# Patient Record
Sex: FEMALE | Race: WHITE | NOT HISPANIC OR LATINO | Employment: FULL TIME | URBAN - METROPOLITAN AREA
[De-identification: names, ages, dates, MRNs, and addresses within clinical notes are randomized per-mention and may not be internally consistent; named-entity substitution may affect disease eponyms.]

---

## 2017-02-07 RX ORDER — LEVOTHYROXINE SODIUM 0.2 MG/1
TABLET ORAL
Qty: 90 TABLET | Refills: 0 | Status: SHIPPED | OUTPATIENT
Start: 2017-02-07 | End: 2017-05-28 | Stop reason: SDUPTHER

## 2017-02-07 RX ORDER — FUROSEMIDE 20 MG/1
TABLET ORAL
Qty: 90 TABLET | Refills: 0 | Status: SHIPPED | OUTPATIENT
Start: 2017-02-07 | End: 2017-05-28 | Stop reason: SDUPTHER

## 2017-02-07 RX ORDER — LISINOPRIL 20 MG/1
TABLET ORAL
Qty: 90 TABLET | Refills: 0 | Status: SHIPPED | OUTPATIENT
Start: 2017-02-07 | End: 2017-03-27

## 2017-03-27 ENCOUNTER — OFFICE VISIT (OUTPATIENT)
Dept: FAMILY MEDICINE CLINIC | Facility: CLINIC | Age: 57
End: 2017-03-27

## 2017-03-27 ENCOUNTER — CONVERSION ENCOUNTER (OUTPATIENT)
Dept: FAMILY MEDICINE CLINIC | Facility: CLINIC | Age: 57
End: 2017-03-27

## 2017-03-27 VITALS
OXYGEN SATURATION: 94 % | WEIGHT: 231 LBS | BODY MASS INDEX: 37.12 KG/M2 | HEIGHT: 66 IN | HEART RATE: 75 BPM | DIASTOLIC BLOOD PRESSURE: 82 MMHG | SYSTOLIC BLOOD PRESSURE: 132 MMHG | TEMPERATURE: 98.1 F | RESPIRATION RATE: 16 BRPM

## 2017-03-27 DIAGNOSIS — I10 BENIGN ESSENTIAL HTN: Primary | ICD-10-CM

## 2017-03-27 DIAGNOSIS — E03.9 ADULT HYPOTHYROIDISM: ICD-10-CM

## 2017-03-27 DIAGNOSIS — J30.1 SEASONAL ALLERGIC RHINITIS DUE TO POLLEN: ICD-10-CM

## 2017-03-27 DIAGNOSIS — L30.9 DERMATITIS: ICD-10-CM

## 2017-03-27 DIAGNOSIS — R53.82 CHRONIC FATIGUE: ICD-10-CM

## 2017-03-27 PROCEDURE — 99214 OFFICE O/P EST MOD 30 MIN: CPT | Performed by: FAMILY MEDICINE

## 2017-03-27 RX ORDER — ESCITALOPRAM OXALATE 10 MG/1
10 TABLET ORAL DAILY
Qty: 90 TABLET | Refills: 3 | Status: SHIPPED | OUTPATIENT
Start: 2017-03-27 | End: 2018-03-22 | Stop reason: SDUPTHER

## 2017-03-27 NOTE — PROGRESS NOTES
Subjective   Christy You is a 56 y.o. female presenting with   Chief Complaint   Patient presents with   • Rash     across bridge of nose  off and on    • Med Refill     lexapro  send to mail order         HPI Comments: 56-year-old single white female nonsmoker here because she says she is tired all the time and she has a lot of aching joints and she also has a rash on her face.  When she looked it up on the Internet she was advised she might have lupus so she would like to be checked for that.    She tells me that even though she has had a left knee replacement, she still has pain in the left knee that is bad enough she has to take something at night to help her sleep like Tylenol.  She can walk without the aid of a cane or walker.    She also says that although she is chronically fatigued she does not have any shortness of breath or chest pain or palpitations or leg swelling or weight loss or night sweats.    Rash   Associated symptoms include fatigue.        The following portions of the patient's history were reviewed and updated as appropriate: current medications, past family history, past medical history, past social history, past surgical history and problem list.    Review of Systems   Constitutional: Positive for fatigue.   Musculoskeletal: Positive for arthralgias.   Skin: Positive for rash.   All other systems reviewed and are negative.      Objective   Physical Exam   Constitutional: She is oriented to person, place, and time. She appears well-developed and well-nourished. No distress.   HENT:   Head: Normocephalic and atraumatic.   Mouth/Throat: Oropharynx is clear and moist. No oropharyngeal exudate.   Eyes: EOM are normal. Pupils are equal, round, and reactive to light. No scleral icterus.   Neck: Normal range of motion. Neck supple. No thyromegaly present.   Cardiovascular: Normal rate, regular rhythm, normal heart sounds and intact distal pulses.  Exam reveals no friction rub.    No murmur  heard.  Pulmonary/Chest: Effort normal and breath sounds normal. No respiratory distress. She has no wheezes.   Musculoskeletal: Normal range of motion. She exhibits tenderness (he has tenderness to range of motion of her knee without any gross deformity or erythema).   Lymphadenopathy:     She has no cervical adenopathy.   Neurological: She is alert and oriented to person, place, and time. No cranial nerve deficit. Coordination normal.   Skin: Skin is warm and dry. Rash (he has a rash on her face and upper neck that looks more compatible with rosacea down with a lupus rash.) noted. She is not diaphoretic.   Psychiatric: She has a normal mood and affect. Her behavior is normal.   Nursing note and vitals reviewed.      Assessment/Plan   Christy was seen today for rash and med refill.    Diagnoses and all orders for this visit:    Benign essential HTN  -     Comprehensive Metabolic Panel  -     TSH  -     T4, Free    Seasonal allergic rhinitis due to pollen    Adult hypothyroidism  -     TSH  -     CBC & Differential  -     T4, Free    Dermatitis  -     CBC & Differential  -     Rheumatoid factor  -     MARTHA  -     C-reactive protein    Chronic fatigue                   I would like him to return for another visit in 6 month(s)

## 2017-03-27 NOTE — PATIENT INSTRUCTIONS
This is a very nice 56-year-old who has been suffering with marked fatigue lately and also is concerned about the facial rash she has.  I will request blood work to evaluate the possibility of lupus and also to further evaluate her fatigue.  I will call her when these results are available.

## 2017-03-28 LAB
ALBUMIN SERPL-MCNC: 4.2 G/DL (ref 3.5–5.2)
ALBUMIN/GLOB SERPL: 1.6 G/DL
ALP SERPL-CCNC: 122 U/L (ref 39–117)
ALT SERPL-CCNC: 36 U/L (ref 1–33)
ANA SER QL: NEGATIVE
AST SERPL-CCNC: 22 U/L (ref 1–32)
BASOPHILS # BLD AUTO: 0.02 10*3/MM3 (ref 0–0.2)
BASOPHILS NFR BLD AUTO: 0.4 % (ref 0–1.5)
BILIRUB SERPL-MCNC: 0.3 MG/DL (ref 0.1–1.2)
BUN SERPL-MCNC: 15 MG/DL (ref 6–20)
BUN/CREAT SERPL: 26.8 (ref 7–25)
CALCIUM SERPL-MCNC: 9.9 MG/DL (ref 8.6–10.5)
CHLORIDE SERPL-SCNC: 103 MMOL/L (ref 98–107)
CO2 SERPL-SCNC: 28.8 MMOL/L (ref 22–29)
CREAT SERPL-MCNC: 0.56 MG/DL (ref 0.57–1)
CRP SERPL-MCNC: 0.14 MG/DL (ref 0–0.5)
EOSINOPHIL # BLD AUTO: 0.12 10*3/MM3 (ref 0–0.7)
EOSINOPHIL NFR BLD AUTO: 2.2 % (ref 0.3–6.2)
ERYTHROCYTE [DISTWIDTH] IN BLOOD BY AUTOMATED COUNT: 12.8 % (ref 11.7–13)
GLOBULIN SER CALC-MCNC: 2.6 GM/DL
GLUCOSE SERPL-MCNC: 97 MG/DL (ref 65–99)
HCT VFR BLD AUTO: 42.5 % (ref 35.6–45.5)
HGB BLD-MCNC: 14.4 G/DL (ref 11.9–15.5)
IMM GRANULOCYTES # BLD: 0 10*3/MM3 (ref 0–0.03)
IMM GRANULOCYTES NFR BLD: 0 % (ref 0–0.5)
LYMPHOCYTES # BLD AUTO: 1.79 10*3/MM3 (ref 0.9–4.8)
LYMPHOCYTES NFR BLD AUTO: 33.2 % (ref 19.6–45.3)
MCH RBC QN AUTO: 31.9 PG (ref 26.9–32)
MCHC RBC AUTO-ENTMCNC: 33.9 G/DL (ref 32.4–36.3)
MCV RBC AUTO: 94 FL (ref 80.5–98.2)
MONOCYTES # BLD AUTO: 0.42 10*3/MM3 (ref 0.2–1.2)
MONOCYTES NFR BLD AUTO: 7.8 % (ref 5–12)
NEUTROPHILS # BLD AUTO: 3.04 10*3/MM3 (ref 1.9–8.1)
NEUTROPHILS NFR BLD AUTO: 56.4 % (ref 42.7–76)
PLATELET # BLD AUTO: 194 10*3/MM3 (ref 140–500)
POTASSIUM SERPL-SCNC: 4.1 MMOL/L (ref 3.5–5.2)
PROT SERPL-MCNC: 6.8 G/DL (ref 6–8.5)
RBC # BLD AUTO: 4.52 10*6/MM3 (ref 3.9–5.2)
RHEUMATOID FACT SERPL-ACNC: <10 IU/ML (ref 0–13.9)
SODIUM SERPL-SCNC: 145 MMOL/L (ref 136–145)
T4 FREE SERPL-MCNC: 2.18 NG/DL (ref 0.93–1.7)
TSH SERPL DL<=0.005 MIU/L-ACNC: 0.31 MIU/ML (ref 0.27–4.2)
WBC # BLD AUTO: 5.39 10*3/MM3 (ref 4.5–10.7)

## 2017-03-30 DIAGNOSIS — L30.9 DERMATITIS: Primary | ICD-10-CM

## 2017-04-04 ENCOUNTER — TELEPHONE (OUTPATIENT)
Dept: FAMILY MEDICINE CLINIC | Facility: CLINIC | Age: 57
End: 2017-04-04

## 2017-04-04 NOTE — TELEPHONE ENCOUNTER
Gwendolyn, looks like rheum factor not done with rest of labs. Maybe get to add on or have come back? This is in your overdue task thanks

## 2017-05-30 RX ORDER — FUROSEMIDE 20 MG/1
TABLET ORAL
Qty: 90 TABLET | Refills: 0 | Status: SHIPPED | OUTPATIENT
Start: 2017-05-30 | End: 2017-08-28 | Stop reason: SDUPTHER

## 2017-05-30 RX ORDER — LEVOTHYROXINE SODIUM 0.2 MG/1
TABLET ORAL
Qty: 90 TABLET | Refills: 0 | Status: SHIPPED | OUTPATIENT
Start: 2017-05-30 | End: 2017-08-28 | Stop reason: SDUPTHER

## 2017-05-30 RX ORDER — LISINOPRIL 20 MG/1
TABLET ORAL
Qty: 90 TABLET | Refills: 0 | Status: SHIPPED | OUTPATIENT
Start: 2017-05-30 | End: 2017-08-28 | Stop reason: SDUPTHER

## 2017-08-28 RX ORDER — FUROSEMIDE 20 MG/1
TABLET ORAL
Qty: 90 TABLET | Refills: 0 | Status: SHIPPED | OUTPATIENT
Start: 2017-08-28 | End: 2017-11-26 | Stop reason: SDUPTHER

## 2017-08-28 RX ORDER — LISINOPRIL 20 MG/1
TABLET ORAL
Qty: 90 TABLET | Refills: 0 | Status: SHIPPED | OUTPATIENT
Start: 2017-08-28 | End: 2017-11-26 | Stop reason: SDUPTHER

## 2017-08-28 RX ORDER — LEVOTHYROXINE SODIUM 0.2 MG/1
TABLET ORAL
Qty: 90 TABLET | Refills: 0 | Status: SHIPPED | OUTPATIENT
Start: 2017-08-28 | End: 2017-11-26 | Stop reason: SDUPTHER

## 2017-09-22 ENCOUNTER — OFFICE VISIT (OUTPATIENT)
Dept: FAMILY MEDICINE CLINIC | Facility: CLINIC | Age: 57
End: 2017-09-22

## 2017-09-22 VITALS
SYSTOLIC BLOOD PRESSURE: 130 MMHG | TEMPERATURE: 98.5 F | BODY MASS INDEX: 37.77 KG/M2 | WEIGHT: 235 LBS | OXYGEN SATURATION: 96 % | HEART RATE: 85 BPM | HEIGHT: 66 IN | DIASTOLIC BLOOD PRESSURE: 76 MMHG

## 2017-09-22 DIAGNOSIS — E03.9 ADULT HYPOTHYROIDISM: ICD-10-CM

## 2017-09-22 DIAGNOSIS — I10 BENIGN ESSENTIAL HTN: ICD-10-CM

## 2017-09-22 DIAGNOSIS — J01.00 ACUTE NON-RECURRENT MAXILLARY SINUSITIS: Primary | ICD-10-CM

## 2017-09-22 PROCEDURE — 99213 OFFICE O/P EST LOW 20 MIN: CPT | Performed by: FAMILY MEDICINE

## 2017-09-22 RX ORDER — AMOXICILLIN AND CLAVULANATE POTASSIUM 875; 125 MG/1; MG/1
1 TABLET, FILM COATED ORAL 2 TIMES DAILY
Qty: 14 TABLET | Refills: 0 | Status: SHIPPED | OUTPATIENT
Start: 2017-09-22 | End: 2019-07-16

## 2017-09-22 RX ORDER — BENZONATATE 100 MG/1
CAPSULE ORAL
Refills: 0 | COMMUNITY
Start: 2017-09-18 | End: 2017-09-22

## 2017-09-22 NOTE — PATIENT INSTRUCTIONS
This is a very nice 56-year-old who is here for sinusitis and cough.  I will prescribe Augmentin to take twice a day with food but would like her to call if she does not get better.

## 2017-09-22 NOTE — PROGRESS NOTES
Subjective   Christy You is a 56 y.o. female presenting with   Chief Complaint   Patient presents with   • URI     cough  sinus and chest congestion         HPI Comments: This is a 56-year-old white female nonsmoker who comes in today with marked sinus congestion and drainage producing a cough.  She tried a telemedicine doctor who prescribed Tessalon, but when she woke up the next morning her lips were all swollen so she is allergic to that.  She was not given an antibiotic but is far she knows is not allergic to any medications other than the Tessalon.    She does not have any chest pain or shortness of breath or high fever or chills.    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        The following portions of the patient's history were reviewed and updated as appropriate: current medications, past family history, past medical history, past social history, past surgical history and problem list.    Review of Systems   HENT: Positive for congestion, postnasal drip, rhinorrhea and sinus pain.    Respiratory: Positive for cough.    All other systems reviewed and are negative.      Objective   Physical Exam   Constitutional: She is oriented to person, place, and time. She appears well-developed and well-nourished. No distress.   HENT:   Head: Normocephalic and atraumatic.   Right Ear: External ear normal.   Left Ear: External ear normal.   Nose: Mucosal edema and rhinorrhea present. Right sinus exhibits maxillary sinus tenderness. Left sinus exhibits maxillary sinus tenderness.   Mouth/Throat: Oropharynx is clear and moist. No oropharyngeal exudate.   Eyes: EOM are normal. Pupils are equal, round, and reactive to light. No scleral icterus.   Neck: Normal range of motion. Neck supple. No thyromegaly present.   Cardiovascular: Normal rate and regular rhythm.     Pulmonary/Chest: Effort normal and breath sounds normal.   Lymphadenopathy:     She has no cervical adenopathy.   Neurological: She is alert and oriented to person, place, and time.   Skin: Skin is warm and dry. She is not diaphoretic.   Psychiatric: She has a normal mood and affect. Her behavior is normal.   Nursing note and vitals reviewed.      Assessment/Plan   Christy was seen today for uri.    Diagnoses and all orders for this visit:    Acute non-recurrent maxillary sinusitis    Benign essential HTN    Adult hypothyroidism    Other orders  -     amoxicillin-clavulanate (AUGMENTIN) 875-125 MG per tablet; Take 1 tablet by mouth 2 (Two) Times a Day.                   I would like him to return for another visit in 6 month(s)

## 2017-11-27 RX ORDER — LEVOTHYROXINE SODIUM 0.2 MG/1
TABLET ORAL
Qty: 90 TABLET | Refills: 0 | Status: SHIPPED | OUTPATIENT
Start: 2017-11-27 | End: 2018-02-24 | Stop reason: SDUPTHER

## 2017-11-27 RX ORDER — FUROSEMIDE 20 MG/1
TABLET ORAL
Qty: 90 TABLET | Refills: 0 | Status: SHIPPED | OUTPATIENT
Start: 2017-11-27 | End: 2018-02-24 | Stop reason: SDUPTHER

## 2017-11-27 RX ORDER — LISINOPRIL 20 MG/1
TABLET ORAL
Qty: 90 TABLET | Refills: 0 | Status: SHIPPED | OUTPATIENT
Start: 2017-11-27 | End: 2018-02-24 | Stop reason: SDUPTHER

## 2018-02-26 RX ORDER — LISINOPRIL 20 MG/1
TABLET ORAL
Qty: 90 TABLET | Refills: 0 | Status: SHIPPED | OUTPATIENT
Start: 2018-02-26 | End: 2018-04-03 | Stop reason: SDUPTHER

## 2018-02-26 RX ORDER — LEVOTHYROXINE SODIUM 0.2 MG/1
TABLET ORAL
Qty: 90 TABLET | Refills: 0 | Status: SHIPPED | OUTPATIENT
Start: 2018-02-26 | End: 2018-04-03 | Stop reason: SDUPTHER

## 2018-02-26 RX ORDER — FUROSEMIDE 20 MG/1
TABLET ORAL
Qty: 90 TABLET | Refills: 0 | Status: SHIPPED | OUTPATIENT
Start: 2018-02-26 | End: 2018-04-03 | Stop reason: SDUPTHER

## 2018-03-22 RX ORDER — ESCITALOPRAM OXALATE 10 MG/1
TABLET ORAL
Qty: 90 TABLET | Refills: 0 | Status: SHIPPED | OUTPATIENT
Start: 2018-03-22 | End: 2018-04-03 | Stop reason: SDUPTHER

## 2018-04-04 RX ORDER — ESCITALOPRAM OXALATE 10 MG/1
10 TABLET ORAL DAILY
Qty: 90 TABLET | Refills: 0 | Status: SHIPPED | OUTPATIENT
Start: 2018-04-04 | End: 2018-06-20 | Stop reason: SDUPTHER

## 2018-04-04 RX ORDER — FUROSEMIDE 20 MG/1
20 TABLET ORAL DAILY
Qty: 90 TABLET | Refills: 0 | Status: SHIPPED | OUTPATIENT
Start: 2018-04-04 | End: 2018-06-20 | Stop reason: SDUPTHER

## 2018-04-04 RX ORDER — LEVOTHYROXINE SODIUM 0.2 MG/1
200 TABLET ORAL DAILY
Qty: 90 TABLET | Refills: 0 | Status: SHIPPED | OUTPATIENT
Start: 2018-04-04 | End: 2018-06-20 | Stop reason: SDUPTHER

## 2018-04-04 RX ORDER — LISINOPRIL 20 MG/1
20 TABLET ORAL DAILY
Qty: 90 TABLET | Refills: 0 | Status: SHIPPED | OUTPATIENT
Start: 2018-04-04 | End: 2018-06-20 | Stop reason: SDUPTHER

## 2018-06-20 RX ORDER — ESCITALOPRAM OXALATE 10 MG/1
10 TABLET ORAL DAILY
Qty: 90 TABLET | Refills: 0 | Status: SHIPPED | OUTPATIENT
Start: 2018-06-20 | End: 2018-09-17 | Stop reason: SDUPTHER

## 2018-06-20 RX ORDER — LISINOPRIL 20 MG/1
20 TABLET ORAL DAILY
Qty: 90 TABLET | Refills: 0 | Status: SHIPPED | OUTPATIENT
Start: 2018-06-20 | End: 2018-09-17 | Stop reason: SDUPTHER

## 2018-06-20 RX ORDER — LEVOTHYROXINE SODIUM 0.2 MG/1
200 TABLET ORAL DAILY
Qty: 90 TABLET | Refills: 0 | Status: SHIPPED | OUTPATIENT
Start: 2018-06-20 | End: 2018-09-17 | Stop reason: SDUPTHER

## 2018-06-20 RX ORDER — FUROSEMIDE 20 MG/1
20 TABLET ORAL DAILY
Qty: 90 TABLET | Refills: 0 | Status: SHIPPED | OUTPATIENT
Start: 2018-06-20 | End: 2018-09-17 | Stop reason: SDUPTHER

## 2018-09-17 RX ORDER — LISINOPRIL 20 MG/1
20 TABLET ORAL DAILY
Qty: 90 TABLET | Refills: 0 | Status: SHIPPED | OUTPATIENT
Start: 2018-09-17 | End: 2018-12-17 | Stop reason: SDUPTHER

## 2018-09-17 RX ORDER — LEVOTHYROXINE SODIUM 0.2 MG/1
200 TABLET ORAL DAILY
Qty: 90 TABLET | Refills: 0 | Status: SHIPPED | OUTPATIENT
Start: 2018-09-17 | End: 2018-12-17 | Stop reason: SDUPTHER

## 2018-09-17 RX ORDER — ESCITALOPRAM OXALATE 10 MG/1
10 TABLET ORAL DAILY
Qty: 90 TABLET | Refills: 0 | Status: SHIPPED | OUTPATIENT
Start: 2018-09-17 | End: 2018-12-17 | Stop reason: SDUPTHER

## 2018-09-17 RX ORDER — FUROSEMIDE 20 MG/1
20 TABLET ORAL DAILY
Qty: 90 TABLET | Refills: 0 | Status: SHIPPED | OUTPATIENT
Start: 2018-09-17 | End: 2018-12-17 | Stop reason: SDUPTHER

## 2018-09-17 NOTE — TELEPHONE ENCOUNTER
rx request for   escitalopram (LEXAPRO) 10 MG  furosemide (LASIX) 20 MG   levothyroxine (SYNTHROID, LEVOTHROID) 200 MCG   lisinopril (PRINIVIL,ZESTRIL) 20 MG     last seen 9/22/17

## 2018-12-17 RX ORDER — LISINOPRIL 20 MG/1
20 TABLET ORAL DAILY
Qty: 90 TABLET | Refills: 0 | Status: SHIPPED | OUTPATIENT
Start: 2018-12-17 | End: 2019-03-18 | Stop reason: SDUPTHER

## 2018-12-17 RX ORDER — FUROSEMIDE 20 MG/1
20 TABLET ORAL DAILY
Qty: 90 TABLET | Refills: 0 | Status: SHIPPED | OUTPATIENT
Start: 2018-12-17 | End: 2019-03-18 | Stop reason: SDUPTHER

## 2018-12-17 RX ORDER — LEVOTHYROXINE SODIUM 0.2 MG/1
200 TABLET ORAL DAILY
Qty: 90 TABLET | Refills: 0 | Status: SHIPPED | OUTPATIENT
Start: 2018-12-17 | End: 2019-03-18 | Stop reason: SDUPTHER

## 2018-12-17 RX ORDER — ESCITALOPRAM OXALATE 10 MG/1
10 TABLET ORAL DAILY
Qty: 90 TABLET | Refills: 0 | Status: SHIPPED | OUTPATIENT
Start: 2018-12-17 | End: 2019-03-18 | Stop reason: SDUPTHER

## 2019-03-19 RX ORDER — LEVOTHYROXINE SODIUM 0.2 MG/1
200 TABLET ORAL DAILY
Qty: 90 TABLET | Refills: 0 | Status: SHIPPED | OUTPATIENT
Start: 2019-03-19 | End: 2019-07-16 | Stop reason: SDUPTHER

## 2019-03-19 RX ORDER — FUROSEMIDE 20 MG/1
20 TABLET ORAL DAILY
Qty: 90 TABLET | Refills: 0 | Status: SHIPPED | OUTPATIENT
Start: 2019-03-19 | End: 2019-07-16 | Stop reason: SDUPTHER

## 2019-03-19 RX ORDER — LISINOPRIL 20 MG/1
20 TABLET ORAL DAILY
Qty: 90 TABLET | Refills: 0 | Status: SHIPPED | OUTPATIENT
Start: 2019-03-19 | End: 2019-07-16 | Stop reason: SDUPTHER

## 2019-03-19 RX ORDER — ESCITALOPRAM OXALATE 10 MG/1
10 TABLET ORAL DAILY
Qty: 90 TABLET | Refills: 0 | Status: SHIPPED | OUTPATIENT
Start: 2019-03-19 | End: 2019-07-16 | Stop reason: SDUPTHER

## 2019-07-16 ENCOUNTER — OFFICE VISIT (OUTPATIENT)
Dept: FAMILY MEDICINE CLINIC | Facility: CLINIC | Age: 59
End: 2019-07-16

## 2019-07-16 ENCOUNTER — RESULTS ENCOUNTER (OUTPATIENT)
Dept: FAMILY MEDICINE CLINIC | Facility: CLINIC | Age: 59
End: 2019-07-16

## 2019-07-16 VITALS
BODY MASS INDEX: 38.22 KG/M2 | OXYGEN SATURATION: 96 % | WEIGHT: 237.8 LBS | TEMPERATURE: 97.4 F | HEIGHT: 66 IN | RESPIRATION RATE: 16 BRPM | HEART RATE: 78 BPM | SYSTOLIC BLOOD PRESSURE: 178 MMHG | DIASTOLIC BLOOD PRESSURE: 108 MMHG

## 2019-07-16 DIAGNOSIS — Z12.11 SCREEN FOR COLON CANCER: ICD-10-CM

## 2019-07-16 DIAGNOSIS — F41.9 ANXIETY: ICD-10-CM

## 2019-07-16 DIAGNOSIS — Z00.00 ROUTINE GENERAL MEDICAL EXAMINATION AT A HEALTH CARE FACILITY: Primary | ICD-10-CM

## 2019-07-16 DIAGNOSIS — E03.9 ADULT HYPOTHYROIDISM: ICD-10-CM

## 2019-07-16 DIAGNOSIS — Z11.9 SCREENING EXAMINATION FOR INFECTIOUS DISEASE: ICD-10-CM

## 2019-07-16 DIAGNOSIS — F33.0 MILD EPISODE OF RECURRENT MAJOR DEPRESSIVE DISORDER (HCC): ICD-10-CM

## 2019-07-16 PROCEDURE — 99396 PREV VISIT EST AGE 40-64: CPT | Performed by: NURSE PRACTITIONER

## 2019-07-16 RX ORDER — FUROSEMIDE 20 MG/1
20 TABLET ORAL DAILY
Qty: 30 TABLET | Refills: 0 | Status: SHIPPED | OUTPATIENT
Start: 2019-07-16 | End: 2019-07-17 | Stop reason: SDUPTHER

## 2019-07-16 RX ORDER — ESCITALOPRAM OXALATE 10 MG/1
10 TABLET ORAL DAILY
Qty: 30 TABLET | Refills: 0 | Status: SHIPPED | OUTPATIENT
Start: 2019-07-16 | End: 2019-07-17 | Stop reason: SDUPTHER

## 2019-07-16 RX ORDER — LISINOPRIL 20 MG/1
20 TABLET ORAL DAILY
Qty: 30 TABLET | Refills: 0 | Status: SHIPPED | OUTPATIENT
Start: 2019-07-16 | End: 2019-07-17 | Stop reason: SDUPTHER

## 2019-07-16 RX ORDER — LEVOTHYROXINE SODIUM 0.2 MG/1
200 TABLET ORAL DAILY
Qty: 30 TABLET | Refills: 0 | Status: SHIPPED | OUTPATIENT
Start: 2019-07-16 | End: 2019-07-17 | Stop reason: SDUPTHER

## 2019-07-16 NOTE — PROGRESS NOTES
Subjective   Christy You is a 58 y.o. female.     Chief Complaint   Patient presents with   • Establish Care     needs rx filled   • Hypertension   • Hyperlipidemia   • Thyroid Problem      HPI patient is new to me.  She is here to establish care for chronic disease management as her provider in this practice has now retired.  She has a history of hypertension, Hashimoto's thyroiditis, anxiety and depression.  She otherwise considers herself to be in good health.  Works as a  [employer].  She is single, not sexually active.  She has well-controlled hypertension however she has been out of her medicine for 3 days and is aware that her blood pressure is high today.  She does not check her blood pressure routinely at home.  She takes Lasix as well as lisinopril.  She had been on HCTZ but it was not as effective as Lasix.  She does not take any potassium and is not had her potassium checked recently.  She denies any muscle cramps.    She has a history of Hashimoto's thyroiditis.  Her TSHs have been controlled and she has been managed by PCP since initial evaluation by endocrinology.  She has been out of her medication for the past 8 days, but has not noticed any increase in her symptoms at this point.    She has chronic anxiety and depression and has been on Lexapro for several years.  She feels good on it and sleeps well.  She is tried to wean herself off of it but her irritability increases and she ends up staying on it.  She feels that she has much more tolerance and patience with people due to decreased anxiety when taking her Lexapro.        She does not have formal exercise but walks her dogs couple times per day, tries to eat healthy.  Currently she is renting a room out in her home to a vegetarian and has recently gained weight due to eating more cheese and bread which is not her normal diet.  She does not want to be on a statin unless she absolutely has to and prefers to try to manage with diet and  exercise which she is planning to work harder on.    He has never had colonoscopy-will do cologuard.  She denies any personal or family history of colon cancer.    No FH breast cancer and has not had a mammogram or a Pap in many years.  Her last GYN has retired and she currently does not have one.    Knee replacement- 2013, left knee and now has some right knee pain which is manageable.  Sometimes she feels that her right knee is a little unstable but she has not had it evaluated and declines Ortho eval at this time.  She does not feel she needs physical therapy currently.     She denies tobacco use, alcohol use and no recreational drug use.    Social History     Tobacco Use   • Smoking status: Never Smoker   Substance Use Topics   • Alcohol use: Not on file   • Drug use: No       The following portions of the patient's history were reviewed and updated as appropriate: allergies, current medications, past family history, past medical history, past social history, past surgical history and problem list.    Review of Systems   Constitutional: Negative for activity change, appetite change, fatigue and unexpected weight change.   HENT: Positive for congestion (intermittent). Negative for ear pain, sore throat and trouble swallowing.    Eyes: Positive for visual disturbance (Corrective lenses). Negative for pain.   Respiratory: Negative for cough, shortness of breath and wheezing.    Cardiovascular: Negative for chest pain and palpitations.   Gastrointestinal: Negative for abdominal pain, anal bleeding, blood in stool, constipation, diarrhea, nausea and vomiting.        Sometimes has indigestion which is controlled with intermittent Tagamet as needed.  This began a couple months ago.  She does not have any difficulty swallowing and has never had an EGD.   Endocrine: Negative for cold intolerance, heat intolerance, polydipsia and polyuria.   Genitourinary: Negative for difficulty urinating, dysuria, frequency, hematuria  "and vaginal bleeding (Postmenopausal).   Musculoskeletal: Negative for back pain and gait problem.   Skin: Negative for rash and wound.   Allergic/Immunologic: Positive for environmental allergies (Seasonal).   Neurological: Positive for headaches (Occasional migraine type headaches resolved with sleeping them off.). Negative for dizziness, seizures and weakness.   Psychiatric/Behavioral: Negative for dysphoric mood and sleep disturbance. The patient is nervous/anxious.        Objective   Blood pressure (!) 178/108, pulse 78, temperature 97.4 °F (36.3 °C), temperature source Oral, resp. rate 16, height 167.6 cm (66\"), weight 108 kg (237 lb 12.8 oz), SpO2 96 %, not currently breastfeeding.    Physical Exam   Constitutional: She is oriented to person, place, and time. She appears well-developed and well-nourished. No distress.   HENT:   Head: Normocephalic and atraumatic.   Right Ear: Tympanic membrane, external ear and ear canal normal.   Left Ear: Tympanic membrane, external ear and ear canal normal.   Mouth/Throat: Uvula is midline and oropharynx is clear and moist.   Eyes: Conjunctivae and EOM are normal. Pupils are equal, round, and reactive to light. Right eye exhibits no discharge. Left eye exhibits no discharge.   Neck: Neck supple. No thyromegaly present.   Cardiovascular: Normal rate, regular rhythm, normal heart sounds and intact distal pulses.   No murmur heard.  Pulmonary/Chest: Effort normal and breath sounds normal.   Abdominal: Soft. Bowel sounds are normal. There is no hepatosplenomegaly. There is no tenderness.   Musculoskeletal: She exhibits no deformity.   Gait smooth and steady  No obvious joint deformities  B/l knee joints are mildly enlarged, without crepitus, mildly decreased ROM noted   Lymphadenopathy:     She has no cervical adenopathy.   Neurological: She is alert and oriented to person, place, and time. No cranial nerve deficit.   Skin: Skin is warm and dry.   Psychiatric: She has a normal " mood and affect. Her behavior is normal. Thought content normal.   Nursing note and vitals reviewed.      Assessment   Problem List Items Addressed This Visit        Endocrine    Adult hypothyroidism    Relevant Medications    levothyroxine (SYNTHROID, LEVOTHROID) 200 MCG tablet      Other Visit Diagnoses     Routine general medical examination at a health care facility    -  Primary    Relevant Orders    Comprehensive metabolic panel    Lipid Panel With LDL/HDL Ratio    CBC and Differential    TSH Rfx On Abnormal To Free T4    Anxiety        Mild episode of recurrent major depressive disorder (CMS/HCC)        Relevant Medications    escitalopram (LEXAPRO) 10 MG tablet    Screen for colon cancer        Relevant Orders    Cologuard - Stool, Per Rectum    Screening examination for infectious disease        Relevant Orders    Hepatitis C Antibody           Procedures PHQ-9 Total Score: 5   JEAN 7 Total Score: 2       Impression and Plan: We will refill her medications today.  We will get baseline labs as she has not had any.  She is willing to do the one-time HCV screening for baby boomers.  Is not willing to do a colonoscopy but is willing after discussion to do a Cologuard which I have ordered.  Stands that if it is positive she will need a colonoscopy.  She has not had a mammo-or Pap and does not want to order a mammo-today however she is willing to return for well woman exam.  I recommended that she get a zoster vaccine from her local pharmacy and she is willing to do this when available.  Hypertension: Not sure how well this is controlled as her blood pressure is very elevated today however she has been out of her medication.  She does have a cuff at home and I have asked her to monitor it and let me know how it is been running with goal of less than 130/80 as she is back on her medications.  Not sure that Lasix is the most effective blood pressure medication.  I will go ahead and refill it but did tell her that  she needs to make sure that her potassium is monitored as Lasix can cause hypokalemia.  We discussed signs and symptoms of this.  I may need to adjust her medications after she is back on her meds and monitoring her blood pressure at home.  Hypothyroidism: We will check a TSH today however she has been out of her medication for the last 8 days so it may not be very reliable.  I will most likely recheck it again in 6 weeks.  Patient says she will have it rechecked in 6 weeks when she returns for her well woman exam.  Anxiety and depression: Appear to be well controlled on her Lexapro.  I do not see a reason to increase her dose or for her to wean off of it which we discussed today.  If she feels better on it then she should stay on it.  I have reviewed her last PCP note and labs in preparation for visit today.   We discussed healthy diet and lifestyle today.  We discussed management of hyperlipidemia if it is noted in her blood work.  She declines a statin unless slightly needed which I will respect.  We discussed dietary modifications to control hyperlipidemia.    We discussed healthy diet and ways to incorporate activity and exercise into daily life.  Recommendations include trying to get at least 150 mins. of moderate intensity aerobic activity that provide enjoyment to lower risks of CVD disease.       Health Maintenance Due   Topic Date Due   • ZOSTER VACCINE (1 of 2) 09/26/2008   • PAP SMEAR  02/08/2016   • COLONOSCOPY  02/08/2016   • MAMMOGRAM  02/11/2016              EMR Dragon/Transcription disclaimer:   Much of this encounter note is an electronic transcription/translation of spoken language to printed text. The electronic translation of spoken language may permit erroneous, or at times, nonsensical words or phrases to be inadvertently transcribed; Although I have reviewed the note for such errors, some may still exist.      Exercising to Stay Healthy  Exercising regularly is important. It has many health  benefits, such as:  · Improving your overall fitness, flexibility, and endurance.  · Increasing your bone density.  · Helping with weight control.  · Decreasing your body fat.  · Increasing your muscle strength.  · Reducing stress and tension.  · Improving your overall health.    In order to become healthy and stay healthy, it is recommended that you do moderate-intensity and vigorous-intensity exercise. You can tell that you are exercising at a moderate intensity if you have a higher heart rate and faster breathing, but you are still able to hold a conversation. You can tell that you are exercising at a vigorous intensity if you are breathing much harder and faster and cannot hold a conversation while exercising.  How often should I exercise?  Choose an activity that you enjoy and set realistic goals. Your health care provider can help you to make an activity plan that works for you. Exercise regularly as directed by your health care provider. This may include:  · Doing resistance training twice each week, such as:  ? Push-ups.  ? Sit-ups.  ? Lifting weights.  ? Using resistance bands.  · Doing a given intensity of exercise for a given amount of time. Choose from these options:  ? 150 minutes of moderate-intensity exercise every week.  ? 75 minutes of vigorous-intensity exercise every week.  ? A mix of moderate-intensity and vigorous-intensity exercise every week.    Children, pregnant women, people who are out of shape, people who are overweight, and older adults may need to consult a health care provider for individual recommendations. If you have any sort of medical condition, be sure to consult your health care provider before starting a new exercise program.  What are some exercise ideas?  Some moderate-intensity exercise ideas include:  · Walking at a rate of 1 mile in 15 minutes.  · Biking.  · Hiking.  · Golfing.  · Dancing.    Some vigorous-intensity exercise ideas include:  · Walking at a rate of at least  4.5 miles per hour.  · Jogging or running at a rate of 5 miles per hour.  · Biking at a rate of at least 10 miles per hour.  · Lap swimming.  · Roller-skating or in-line skating.  · Cross-country skiing.  · Vigorous competitive sports, such as football, basketball, and soccer.  · Jumping rope.  · Aerobic dancing.    What are some everyday activities that can help me to get exercise?  · Yard work, such as:  ? Pushing a .  ? Raking and bagging leaves.  · Washing and waxing your car.  · Pushing a stroller.  · Shoveling snow.  · Gardening.  · Washing windows or floors.  How can I be more active in my day-to-day activities?  · Use the stairs instead of the elevator.  · Take a walk during your lunch break.  · If you drive, park your car farther away from work or school.  · If you take public transportation, get off one stop early and walk the rest of the way.  · Make all of your phone calls while standing up and walking around.  · Get up, stretch, and walk around every 30 minutes throughout the day.  What guidelines should I follow while exercising?  · Do not exercise so much that you hurt yourself, feel dizzy, or get very short of breath.  · Consult your health care provider before starting a new exercise program.  · Wear comfortable clothes and shoes with good support.  · Drink plenty of water while you exercise to prevent dehydration or heat stroke. Body water is lost during exercise and must be replaced.  · Work out until you breathe faster and your heart beats faster.  This information is not intended to replace advice given to you by your health care provider. Make sure you discuss any questions you have with your health care provider.  Document Released: 01/20/2012 Document Revised: 05/25/2017 Document Reviewed: 05/21/2015  [employer]vier Interactive Patient Education © 2018 [employer]

## 2019-07-17 LAB
ALBUMIN SERPL-MCNC: 4.5 G/DL (ref 3.5–5.2)
ALBUMIN/GLOB SERPL: 2 G/DL
ALP SERPL-CCNC: 119 U/L (ref 39–117)
ALT SERPL-CCNC: 32 U/L (ref 1–33)
AST SERPL-CCNC: 22 U/L (ref 1–32)
BASOPHILS # BLD AUTO: 0.03 10*3/MM3 (ref 0–0.2)
BASOPHILS NFR BLD AUTO: 0.5 % (ref 0–1.5)
BILIRUB SERPL-MCNC: 0.3 MG/DL (ref 0.2–1.2)
BUN SERPL-MCNC: 12 MG/DL (ref 8–23)
BUN/CREAT SERPL: 15.8 (ref 7–25)
CALCIUM SERPL-MCNC: 9.1 MG/DL (ref 8.6–10.5)
CHLORIDE SERPL-SCNC: 101 MMOL/L (ref 98–107)
CHOLEST SERPL-MCNC: 214 MG/DL (ref 0–200)
CO2 SERPL-SCNC: 26.6 MMOL/L (ref 22–29)
CREAT SERPL-MCNC: 0.76 MG/DL (ref 0.57–1)
EOSINOPHIL # BLD AUTO: 0.11 10*3/MM3 (ref 0–0.4)
EOSINOPHIL NFR BLD AUTO: 1.7 % (ref 0.3–6.2)
ERYTHROCYTE [DISTWIDTH] IN BLOOD BY AUTOMATED COUNT: 12.1 % (ref 12.3–15.4)
GLOBULIN SER CALC-MCNC: 2.2 GM/DL
GLUCOSE SERPL-MCNC: 97 MG/DL (ref 65–99)
HCT VFR BLD AUTO: 45.6 % (ref 34–46.6)
HCV AB S/CO SERPL IA: <0.1 S/CO RATIO (ref 0–0.9)
HDLC SERPL-MCNC: 36 MG/DL (ref 40–60)
HGB BLD-MCNC: 15.3 G/DL (ref 12–15.9)
IMM GRANULOCYTES # BLD AUTO: 0.01 10*3/MM3 (ref 0–0.05)
IMM GRANULOCYTES NFR BLD AUTO: 0.2 % (ref 0–0.5)
LDLC SERPL CALC-MCNC: 118 MG/DL (ref 0–100)
LDLC/HDLC SERPL: 3.28 {RATIO}
LYMPHOCYTES # BLD AUTO: 2.23 10*3/MM3 (ref 0.7–3.1)
LYMPHOCYTES NFR BLD AUTO: 34.4 % (ref 19.6–45.3)
MCH RBC QN AUTO: 30.8 PG (ref 26.6–33)
MCHC RBC AUTO-ENTMCNC: 33.6 G/DL (ref 31.5–35.7)
MCV RBC AUTO: 91.9 FL (ref 79–97)
MONOCYTES # BLD AUTO: 0.41 10*3/MM3 (ref 0.1–0.9)
MONOCYTES NFR BLD AUTO: 6.3 % (ref 5–12)
NEUTROPHILS # BLD AUTO: 3.69 10*3/MM3 (ref 1.7–7)
NEUTROPHILS NFR BLD AUTO: 56.9 % (ref 42.7–76)
NRBC BLD AUTO-RTO: 0 /100 WBC (ref 0–0.2)
PLATELET # BLD AUTO: 225 10*3/MM3 (ref 140–450)
POTASSIUM SERPL-SCNC: 4.2 MMOL/L (ref 3.5–5.2)
PROT SERPL-MCNC: 6.7 G/DL (ref 6–8.5)
RBC # BLD AUTO: 4.96 10*6/MM3 (ref 3.77–5.28)
SODIUM SERPL-SCNC: 141 MMOL/L (ref 136–145)
TRIGL SERPL-MCNC: 300 MG/DL (ref 0–150)
TSH SERPL DL<=0.005 MIU/L-ACNC: 1.07 MIU/ML (ref 0.27–4.2)
VLDLC SERPL CALC-MCNC: 60 MG/DL
WBC # BLD AUTO: 6.48 10*3/MM3 (ref 3.4–10.8)

## 2019-07-17 RX ORDER — FUROSEMIDE 20 MG/1
20 TABLET ORAL DAILY
Qty: 90 TABLET | Refills: 0 | Status: SHIPPED | OUTPATIENT
Start: 2019-07-17 | End: 2019-11-15 | Stop reason: SDUPTHER

## 2019-07-17 RX ORDER — LEVOTHYROXINE SODIUM 0.2 MG/1
200 TABLET ORAL DAILY
Qty: 90 TABLET | Refills: 0 | Status: SHIPPED | OUTPATIENT
Start: 2019-07-17 | End: 2019-11-15 | Stop reason: SDUPTHER

## 2019-07-17 RX ORDER — LISINOPRIL 20 MG/1
20 TABLET ORAL DAILY
Qty: 90 TABLET | Refills: 0 | Status: SHIPPED | OUTPATIENT
Start: 2019-07-17 | End: 2019-11-15 | Stop reason: SDUPTHER

## 2019-07-17 RX ORDER — ESCITALOPRAM OXALATE 10 MG/1
10 TABLET ORAL DAILY
Qty: 90 TABLET | Refills: 0 | Status: SHIPPED | OUTPATIENT
Start: 2019-07-17 | End: 2019-11-15 | Stop reason: SDUPTHER

## 2019-07-17 NOTE — PROGRESS NOTES
Spoke in detail with Patient about results, patient expressed understanding and will follow up as agreed.     Any pending Labs and/or Diagnostic procedures required have been ordered for future release.    Olive ISABEL

## 2019-11-15 RX ORDER — LISINOPRIL 20 MG/1
20 TABLET ORAL DAILY
Qty: 90 TABLET | Refills: 3 | Status: SHIPPED | OUTPATIENT
Start: 2019-11-15 | End: 2020-12-03 | Stop reason: SDUPTHER

## 2019-11-15 RX ORDER — FUROSEMIDE 20 MG/1
20 TABLET ORAL DAILY
Qty: 90 TABLET | Refills: 3 | Status: SHIPPED | OUTPATIENT
Start: 2019-11-15 | End: 2020-12-03 | Stop reason: SDUPTHER

## 2019-11-15 RX ORDER — LEVOTHYROXINE SODIUM 0.2 MG/1
200 TABLET ORAL DAILY
Qty: 90 TABLET | Refills: 3 | Status: SHIPPED | OUTPATIENT
Start: 2019-11-15 | End: 2020-12-03 | Stop reason: SDUPTHER

## 2019-11-15 RX ORDER — ESCITALOPRAM OXALATE 10 MG/1
10 TABLET ORAL DAILY
Qty: 90 TABLET | Refills: 3 | Status: SHIPPED | OUTPATIENT
Start: 2019-11-15 | End: 2020-12-03 | Stop reason: SDUPTHER

## 2020-11-24 RX ORDER — FUROSEMIDE 20 MG/1
20 TABLET ORAL DAILY
Qty: 90 TABLET | Refills: 3 | OUTPATIENT
Start: 2020-11-24

## 2020-11-24 RX ORDER — ESCITALOPRAM OXALATE 10 MG/1
10 TABLET ORAL DAILY
Qty: 90 TABLET | Refills: 3 | OUTPATIENT
Start: 2020-11-24

## 2020-11-24 RX ORDER — LISINOPRIL 20 MG/1
20 TABLET ORAL DAILY
Qty: 90 TABLET | Refills: 3 | OUTPATIENT
Start: 2020-11-24

## 2020-11-24 RX ORDER — LEVOTHYROXINE SODIUM 0.2 MG/1
200 TABLET ORAL DAILY
Qty: 90 TABLET | Refills: 3 | OUTPATIENT
Start: 2020-11-24

## 2020-12-02 ENCOUNTER — TELEMEDICINE (OUTPATIENT)
Dept: FAMILY MEDICINE CLINIC | Facility: CLINIC | Age: 60
End: 2020-12-02

## 2020-12-02 DIAGNOSIS — E03.9 ADULT HYPOTHYROIDISM: ICD-10-CM

## 2020-12-02 DIAGNOSIS — Z13.220 SCREENING FOR LIPOID DISORDERS: ICD-10-CM

## 2020-12-02 DIAGNOSIS — F41.9 ANXIETY: ICD-10-CM

## 2020-12-02 DIAGNOSIS — I10 BENIGN ESSENTIAL HTN: Primary | ICD-10-CM

## 2020-12-02 PROCEDURE — 99214 OFFICE O/P EST MOD 30 MIN: CPT | Performed by: NURSE PRACTITIONER

## 2020-12-02 NOTE — PROGRESS NOTES
Subjective   Christy You is a 60 y.o. female.     No chief complaint on file.     CC: f/u on HTN, hypothyroid    HPI patient requested a video visit for hypertension, hypothyroid follow-up.    She has not been in the office in over a year and was not able to get refills on her medications as she has not had labs done.    She reports that she checks her blood pressure occasionally and it has been running about 130/80.  She is concerned because she is run completely out of her medication.  She is on her last day of levothyroxine.    She has been taking her lisinopril and Lasix daily for blood pressure control without any side effects.  She does not take any potassium supplements and is not aware that her potassium has been low or even checked.  She is not sure why she is on Lasix in particular.    She has a long history of hypothyroidism and has been taking and tolerating her levothyroxine.  She denies any signs or symptoms of hypo or hyperthyroidism but admits she would not recognize either one.  She always has fatigue and mild depression.    She has a history of chronic anxiety and probably depression.  She has been on Lexapro and feels that it works as well as anything could.  She has been feeling more stressed and depressed due to the loss of her mother from ALS recently.    She feels that if she were to be honest she does not have much use for healthcare professionals because it took so long for her mother to get diagnosed and she does not feel like people really listening to what her or her mother were concerned about.      Social History     Tobacco Use   • Smoking status: Never Smoker   Substance Use Topics   • Alcohol use: Not on file   • Drug use: No       The following portions of the patient's history were reviewed and updated as appropriate: allergies, current medications, past family history, past medical history, past social history, past surgical history and problem list.    Review of Systems    Constitutional: Positive for fatigue. Negative for activity change, appetite change and unexpected weight change.   Eyes: Negative for pain and visual disturbance.   Respiratory: Negative for cough and shortness of breath.    Cardiovascular: Negative for chest pain, palpitations and leg swelling.   Gastrointestinal: Negative for abdominal pain, blood in stool, constipation, diarrhea, nausea and vomiting.   Endocrine: Negative for cold intolerance and heat intolerance.   Neurological: Negative for dizziness, weakness and headaches.   Psychiatric/Behavioral: Positive for dysphoric mood. Negative for sleep disturbance. The patient is nervous/anxious.        Objective   not currently breastfeeding.  There is no height or weight on file to calculate BMI.    Physical Exam  Limited by video visit.  She is well appearing and does not seem to be distressed.  She seems alert and oriented and her mood and affect are normal for situation, good historian of medical history.  She was mildly tearful at times. No cough or dyspnea appreciated, able to complete sentences without problem.       Assessment   Problem List Items Addressed This Visit        Cardiovascular and Mediastinum    Benign essential HTN - Primary    Relevant Medications    lisinopril (PRINIVIL,ZESTRIL) 20 MG tablet    furosemide (LASIX) 20 MG tablet    Other Relevant Orders    CBC & Differential    Comprehensive Metabolic Panel       Endocrine    Adult hypothyroidism    Relevant Medications    levothyroxine (SYNTHROID, LEVOTHROID) 200 MCG tablet       Other    Anxiety    Relevant Medications    escitalopram (LEXAPRO) 10 MG tablet    Other Relevant Orders    TSH      Other Visit Diagnoses     Screening for lipoid disorders        Relevant Orders    Lipid Panel           Procedures           Impression and Plan: I will give her med refills for 30 days.  But I do need her to come in and get blood work done in the next couple weeks to monitor for any side effects.   We discussed safety concerns of medications being taken without knowing if they are effective or having any side effects which is why we need to monitor labs every 6 months-1 yr.     We discussed her concerns about being disappointed in the medical profession.  I encouraged her to not let that keep her from getting the care that she needs and deserves.    I have asked her to see me via MyChart her blood pressures in the next couple weeks so I can make sure that her medications are adequate.        Health Maintenance Due   Topic Date Due   • COLONOSCOPY  1960   • ZOSTER VACCINE (1 of 2) 09/26/2008   • PAP SMEAR  02/08/2016   • MAMMOGRAM  02/11/2016   • ANNUAL PHYSICAL  07/17/2020   • INFLUENZA VACCINE  08/01/2020       Patient gave consent today for a telehealth video visit as following recommendations of our governor and CDC during the COVID-19 pandemic.    15 min was spent in discussion with pt and greater than 50% of that time was spent counseling.       EMR Dragon/Transcription disclaimer:   Much of this encounter note is an electronic transcription/translation of spoken language to printed text. The electronic translation of spoken language may permit erroneous, or at times, nonsensical words or phrases to be inadvertently transcribed; Although I have reviewed the note for such errors, some may still exist.      Doximity platform used with good A/V quality.

## 2020-12-03 ENCOUNTER — TELEPHONE (OUTPATIENT)
Dept: FAMILY MEDICINE CLINIC | Facility: CLINIC | Age: 60
End: 2020-12-03

## 2020-12-03 RX ORDER — LEVOTHYROXINE SODIUM 0.2 MG/1
200 TABLET ORAL DAILY
Qty: 90 TABLET | Refills: 3 | Status: CANCELLED | OUTPATIENT
Start: 2020-12-03

## 2020-12-03 RX ORDER — FUROSEMIDE 20 MG/1
20 TABLET ORAL DAILY
Qty: 90 TABLET | Refills: 3 | Status: CANCELLED | OUTPATIENT
Start: 2020-12-03

## 2020-12-03 RX ORDER — LISINOPRIL 20 MG/1
20 TABLET ORAL DAILY
Qty: 90 TABLET | Refills: 3 | Status: CANCELLED | OUTPATIENT
Start: 2020-12-03

## 2020-12-03 RX ORDER — LISINOPRIL 20 MG/1
20 TABLET ORAL DAILY
Qty: 30 TABLET | Refills: 0 | Status: SHIPPED | OUTPATIENT
Start: 2020-12-03 | End: 2021-01-06 | Stop reason: SDUPTHER

## 2020-12-03 RX ORDER — ESCITALOPRAM OXALATE 10 MG/1
10 TABLET ORAL DAILY
Qty: 30 TABLET | Refills: 0 | Status: SHIPPED | OUTPATIENT
Start: 2020-12-03 | End: 2021-01-06 | Stop reason: SDUPTHER

## 2020-12-03 RX ORDER — LEVOTHYROXINE SODIUM 0.2 MG/1
200 TABLET ORAL DAILY
Qty: 30 TABLET | Refills: 0 | Status: SHIPPED | OUTPATIENT
Start: 2020-12-03 | End: 2021-01-06 | Stop reason: SDUPTHER

## 2020-12-03 RX ORDER — FUROSEMIDE 20 MG/1
20 TABLET ORAL DAILY
Qty: 30 TABLET | Refills: 0 | Status: SHIPPED | OUTPATIENT
Start: 2020-12-03 | End: 2021-01-06 | Stop reason: SDUPTHER

## 2020-12-03 NOTE — TELEPHONE ENCOUNTER
Caller: Christy You    Relationship: Self    Best call back number: [phone]     Medication needed:   Requested Prescriptions     Pending Prescriptions Disp Refills   • levothyroxine (SYNTHROID, LEVOTHROID) 200 MCG tablet 90 tablet 3     Sig: Take 1 tablet by mouth Daily.   • furosemide (LASIX) 20 MG tablet 90 tablet 3     Sig: Take 1 tablet by mouth Daily.   • lisinopril (PRINIVIL,ZESTRIL) 20 MG tablet 90 tablet 3     Sig: Take 1 tablet by mouth Daily.           What is the patient's preferred pharmacy: [facility] #[id number] [id number] AT [facility] - [phone]  - [phone] FX

## 2020-12-10 ENCOUNTER — LAB (OUTPATIENT)
Dept: LAB | Facility: HOSPITAL | Age: 60
End: 2020-12-10

## 2020-12-10 DIAGNOSIS — F41.9 ANXIETY: ICD-10-CM

## 2020-12-10 DIAGNOSIS — Z13.220 SCREENING FOR LIPOID DISORDERS: ICD-10-CM

## 2020-12-10 DIAGNOSIS — I10 BENIGN ESSENTIAL HTN: ICD-10-CM

## 2020-12-10 LAB
ALBUMIN SERPL-MCNC: 4 G/DL (ref 3.5–5.2)
ALBUMIN/GLOB SERPL: 1.5 G/DL
ALP SERPL-CCNC: 102 U/L (ref 39–117)
ALT SERPL W P-5'-P-CCNC: 24 U/L (ref 1–33)
ANION GAP SERPL CALCULATED.3IONS-SCNC: 5.7 MMOL/L (ref 5–15)
AST SERPL-CCNC: 15 U/L (ref 1–32)
BASOPHILS # BLD AUTO: 0.03 10*3/MM3 (ref 0–0.2)
BASOPHILS NFR BLD AUTO: 0.7 % (ref 0–1.5)
BILIRUB SERPL-MCNC: 0.5 MG/DL (ref 0–1.2)
BUN SERPL-MCNC: 13 MG/DL (ref 8–23)
BUN/CREAT SERPL: 16.7 (ref 7–25)
CALCIUM SPEC-SCNC: 9.4 MG/DL (ref 8.6–10.5)
CHLORIDE SERPL-SCNC: 106 MMOL/L (ref 98–107)
CHOLEST SERPL-MCNC: 188 MG/DL (ref 0–200)
CO2 SERPL-SCNC: 30.3 MMOL/L (ref 22–29)
CREAT SERPL-MCNC: 0.78 MG/DL (ref 0.57–1)
DEPRECATED RDW RBC AUTO: 42 FL (ref 37–54)
EOSINOPHIL # BLD AUTO: 0.13 10*3/MM3 (ref 0–0.4)
EOSINOPHIL NFR BLD AUTO: 3 % (ref 0.3–6.2)
ERYTHROCYTE [DISTWIDTH] IN BLOOD BY AUTOMATED COUNT: 12.9 % (ref 12.3–15.4)
GFR SERPL CREATININE-BSD FRML MDRD: 75 ML/MIN/1.73
GLOBULIN UR ELPH-MCNC: 2.6 GM/DL
GLUCOSE SERPL-MCNC: 110 MG/DL (ref 65–99)
HCT VFR BLD AUTO: 41.9 % (ref 34–46.6)
HDLC SERPL-MCNC: 39 MG/DL (ref 40–60)
HGB BLD-MCNC: 14.7 G/DL (ref 12–15.9)
IMM GRANULOCYTES # BLD AUTO: 0 10*3/MM3 (ref 0–0.05)
IMM GRANULOCYTES NFR BLD AUTO: 0 % (ref 0–0.5)
LDLC SERPL CALC-MCNC: 122 MG/DL (ref 0–100)
LDLC/HDLC SERPL: 3.06 {RATIO}
LYMPHOCYTES # BLD AUTO: 1.28 10*3/MM3 (ref 0.7–3.1)
LYMPHOCYTES NFR BLD AUTO: 29.6 % (ref 19.6–45.3)
MCH RBC QN AUTO: 31.1 PG (ref 26.6–33)
MCHC RBC AUTO-ENTMCNC: 35.1 G/DL (ref 31.5–35.7)
MCV RBC AUTO: 88.8 FL (ref 79–97)
MONOCYTES # BLD AUTO: 0.3 10*3/MM3 (ref 0.1–0.9)
MONOCYTES NFR BLD AUTO: 6.9 % (ref 5–12)
NEUTROPHILS NFR BLD AUTO: 2.58 10*3/MM3 (ref 1.7–7)
NEUTROPHILS NFR BLD AUTO: 59.8 % (ref 42.7–76)
NRBC BLD AUTO-RTO: 0 /100 WBC (ref 0–0.2)
PLATELET # BLD AUTO: 185 10*3/MM3 (ref 140–450)
PMV BLD AUTO: 9.3 FL (ref 6–12)
POTASSIUM SERPL-SCNC: 4.1 MMOL/L (ref 3.5–5.2)
PROT SERPL-MCNC: 6.6 G/DL (ref 6–8.5)
RBC # BLD AUTO: 4.72 10*6/MM3 (ref 3.77–5.28)
SODIUM SERPL-SCNC: 142 MMOL/L (ref 136–145)
TRIGL SERPL-MCNC: 149 MG/DL (ref 0–150)
TSH SERPL DL<=0.05 MIU/L-ACNC: 0.46 UIU/ML (ref 0.27–4.2)
VLDLC SERPL-MCNC: 27 MG/DL (ref 5–40)
WBC # BLD AUTO: 4.32 10*3/MM3 (ref 3.4–10.8)

## 2020-12-10 PROCEDURE — 84443 ASSAY THYROID STIM HORMONE: CPT

## 2020-12-10 PROCEDURE — 85025 COMPLETE CBC W/AUTO DIFF WBC: CPT

## 2020-12-10 PROCEDURE — 80053 COMPREHEN METABOLIC PANEL: CPT

## 2020-12-10 PROCEDURE — 36415 COLL VENOUS BLD VENIPUNCTURE: CPT

## 2020-12-10 PROCEDURE — 80061 LIPID PANEL: CPT

## 2020-12-11 DIAGNOSIS — R73.09 ELEVATED GLUCOSE: Primary | ICD-10-CM

## 2020-12-11 DIAGNOSIS — R73.09 ELEVATED GLUCOSE: ICD-10-CM

## 2021-01-06 DIAGNOSIS — F41.9 ANXIETY: ICD-10-CM

## 2021-01-06 DIAGNOSIS — I10 BENIGN ESSENTIAL HTN: ICD-10-CM

## 2021-01-06 DIAGNOSIS — E03.9 ADULT HYPOTHYROIDISM: ICD-10-CM

## 2021-01-06 RX ORDER — LISINOPRIL 20 MG/1
20 TABLET ORAL DAILY
Qty: 30 TABLET | Refills: 0 | Status: CANCELLED | OUTPATIENT
Start: 2021-01-06

## 2021-01-06 RX ORDER — LEVOTHYROXINE SODIUM 0.2 MG/1
200 TABLET ORAL DAILY
Qty: 30 TABLET | Refills: 0 | Status: SHIPPED | OUTPATIENT
Start: 2021-01-06 | End: 2021-01-11 | Stop reason: SDUPTHER

## 2021-01-06 RX ORDER — ESCITALOPRAM OXALATE 10 MG/1
10 TABLET ORAL DAILY
Qty: 30 TABLET | Refills: 0 | Status: SHIPPED | OUTPATIENT
Start: 2021-01-06 | End: 2021-01-11 | Stop reason: SDUPTHER

## 2021-01-06 RX ORDER — FUROSEMIDE 20 MG/1
20 TABLET ORAL DAILY
Qty: 30 TABLET | Refills: 0 | Status: SHIPPED | OUTPATIENT
Start: 2021-01-06 | End: 2021-01-11 | Stop reason: SDUPTHER

## 2021-01-06 RX ORDER — LISINOPRIL 20 MG/1
20 TABLET ORAL DAILY
Qty: 30 TABLET | Refills: 0 | Status: SHIPPED | OUTPATIENT
Start: 2021-01-06 | End: 2021-01-11 | Stop reason: SDUPTHER

## 2021-01-06 NOTE — TELEPHONE ENCOUNTER
Caller: Christy You    Relationship: Self    Best call back number: [phone]       Medication needed: lisinopril (PRINIVIL,ZESTRIL) 20 MG tablet    When do you need the refill by: TODAY    What details did the patient provide when requesting the medication: PATIENT IS COMPLETELY OUT OF MEDS    Does the patient have less than a 3 day supply:  [] Yes  [x] No    What is the patient's preferred pharmacy: [facility] #[id number] [facility] [address] AT [facility] OF [facility] [phone] [facility] [phone] FX           PATIENT IS CALLING TO SEE IF REFILL REQUEST FOR LISINOPRIL WAS RECEIVED. PATIENT STATES SHE IS COMPLETELY OUT OF MEDICATION.

## 2021-01-11 DIAGNOSIS — F41.9 ANXIETY: ICD-10-CM

## 2021-01-11 DIAGNOSIS — I10 BENIGN ESSENTIAL HTN: ICD-10-CM

## 2021-01-11 DIAGNOSIS — E03.9 ADULT HYPOTHYROIDISM: ICD-10-CM

## 2021-01-11 RX ORDER — ESCITALOPRAM OXALATE 10 MG/1
10 TABLET ORAL DAILY
Qty: 90 TABLET | Refills: 0 | Status: SHIPPED | OUTPATIENT
Start: 2021-01-11 | End: 2021-04-07 | Stop reason: SDUPTHER

## 2021-01-11 RX ORDER — LEVOTHYROXINE SODIUM 0.2 MG/1
200 TABLET ORAL DAILY
Qty: 90 TABLET | Refills: 0 | Status: SHIPPED | OUTPATIENT
Start: 2021-01-11 | End: 2021-04-07 | Stop reason: SDUPTHER

## 2021-01-11 RX ORDER — LISINOPRIL 20 MG/1
20 TABLET ORAL DAILY
Qty: 90 TABLET | Refills: 0 | Status: SHIPPED | OUTPATIENT
Start: 2021-01-11 | End: 2021-04-07 | Stop reason: SDUPTHER

## 2021-01-11 RX ORDER — FUROSEMIDE 20 MG/1
20 TABLET ORAL DAILY
Qty: 90 TABLET | Refills: 0 | Status: SHIPPED | OUTPATIENT
Start: 2021-01-11 | End: 2021-04-07 | Stop reason: SDUPTHER

## 2021-02-03 ENCOUNTER — LAB REQUISITION (OUTPATIENT)
Dept: LAB | Facility: HOSPITAL | Age: 61
End: 2021-02-03

## 2021-02-03 DIAGNOSIS — Z00.00 ENCOUNTER FOR GENERAL ADULT MEDICAL EXAMINATION WITHOUT ABNORMAL FINDINGS: ICD-10-CM

## 2021-02-03 LAB — SARS-COV-2 ORF1AB RESP QL NAA+PROBE: NOT DETECTED

## 2021-02-03 PROCEDURE — U0004 COV-19 TEST NON-CDC HGH THRU: HCPCS | Performed by: OPHTHALMOLOGY

## 2021-02-17 ENCOUNTER — LAB REQUISITION (OUTPATIENT)
Dept: LAB | Facility: HOSPITAL | Age: 61
End: 2021-02-17

## 2021-02-17 DIAGNOSIS — Z00.00 ENCOUNTER FOR GENERAL ADULT MEDICAL EXAMINATION WITHOUT ABNORMAL FINDINGS: ICD-10-CM

## 2021-02-17 LAB — SARS-COV-2 ORF1AB RESP QL NAA+PROBE: NOT DETECTED

## 2021-02-17 PROCEDURE — U0004 COV-19 TEST NON-CDC HGH THRU: HCPCS | Performed by: OPHTHALMOLOGY

## 2021-03-16 ENCOUNTER — BULK ORDERING (OUTPATIENT)
Dept: CASE MANAGEMENT | Facility: OTHER | Age: 61
End: 2021-03-16

## 2021-03-16 DIAGNOSIS — Z23 IMMUNIZATION DUE: ICD-10-CM

## 2021-04-07 DIAGNOSIS — F41.9 ANXIETY: ICD-10-CM

## 2021-04-07 DIAGNOSIS — E03.9 ADULT HYPOTHYROIDISM: ICD-10-CM

## 2021-04-07 DIAGNOSIS — I10 BENIGN ESSENTIAL HTN: ICD-10-CM

## 2021-04-07 RX ORDER — ESCITALOPRAM OXALATE 10 MG/1
10 TABLET ORAL DAILY
Qty: 90 TABLET | Refills: 1 | Status: SHIPPED | OUTPATIENT
Start: 2021-04-07 | End: 2021-10-08 | Stop reason: DRUGHIGH

## 2021-04-07 RX ORDER — FUROSEMIDE 20 MG/1
20 TABLET ORAL DAILY
Qty: 90 TABLET | Refills: 1 | Status: SHIPPED | OUTPATIENT
Start: 2021-04-07 | End: 2021-10-08

## 2021-04-07 RX ORDER — LEVOTHYROXINE SODIUM 0.2 MG/1
200 TABLET ORAL DAILY
Qty: 90 TABLET | Refills: 1 | Status: SHIPPED | OUTPATIENT
Start: 2021-04-07 | End: 2021-10-18 | Stop reason: SDUPTHER

## 2021-04-07 RX ORDER — LISINOPRIL 20 MG/1
20 TABLET ORAL DAILY
Qty: 90 TABLET | Refills: 1 | Status: SHIPPED | OUTPATIENT
Start: 2021-04-07 | End: 2021-10-08 | Stop reason: SINTOL

## 2021-10-08 ENCOUNTER — OFFICE VISIT (OUTPATIENT)
Dept: FAMILY MEDICINE CLINIC | Facility: CLINIC | Age: 61
End: 2021-10-08

## 2021-10-08 VITALS
OXYGEN SATURATION: 95 % | HEIGHT: 66 IN | DIASTOLIC BLOOD PRESSURE: 87 MMHG | WEIGHT: 230.3 LBS | BODY MASS INDEX: 37.01 KG/M2 | TEMPERATURE: 97.3 F | HEART RATE: 68 BPM | SYSTOLIC BLOOD PRESSURE: 160 MMHG

## 2021-10-08 DIAGNOSIS — I10 BENIGN ESSENTIAL HTN: Primary | ICD-10-CM

## 2021-10-08 DIAGNOSIS — Z12.31 ENCOUNTER FOR SCREENING MAMMOGRAM FOR MALIGNANT NEOPLASM OF BREAST: ICD-10-CM

## 2021-10-08 DIAGNOSIS — E03.9 ADULT HYPOTHYROIDISM: ICD-10-CM

## 2021-10-08 DIAGNOSIS — F41.9 ANXIETY: ICD-10-CM

## 2021-10-08 DIAGNOSIS — R73.09 ELEVATED GLUCOSE: ICD-10-CM

## 2021-10-08 DIAGNOSIS — E78.2 MIXED HYPERLIPIDEMIA: ICD-10-CM

## 2021-10-08 PROCEDURE — 93000 ELECTROCARDIOGRAM COMPLETE: CPT | Performed by: NURSE PRACTITIONER

## 2021-10-08 PROCEDURE — 99214 OFFICE O/P EST MOD 30 MIN: CPT | Performed by: NURSE PRACTITIONER

## 2021-10-08 RX ORDER — LOSARTAN POTASSIUM AND HYDROCHLOROTHIAZIDE 12.5; 5 MG/1; MG/1
1 TABLET ORAL DAILY
Qty: 30 TABLET | Refills: 3 | Status: SHIPPED | OUTPATIENT
Start: 2021-10-08 | End: 2021-12-29 | Stop reason: DRUGHIGH

## 2021-10-08 RX ORDER — ESCITALOPRAM OXALATE 20 MG/1
20 TABLET ORAL DAILY
Qty: 90 TABLET | Refills: 1 | Status: SHIPPED | OUTPATIENT
Start: 2021-10-08 | End: 2021-10-28 | Stop reason: SDUPTHER

## 2021-10-08 NOTE — PROGRESS NOTES
Chief Complaint  Med Management    Subjective          Christy You presents to [facility]  History of Present Illness patient returns today for management of hypertension, hypothyroidism, anxiety.  She has no health concerns today.    Her blood pressure is high today but she has been out of her medication due to noncompliance with office visits and lab work.  She has not been checking her blood pressure at home.  She again reports that she is not happy with medical care due to what she thinks was poor care of her mother prior to her death about a year ago.  For this reason patient does not seek medical care unless she absolutely has to.  She reports a long history of hypertension.  Says she was worked up for renal disease when she was young.  Always thought it was due in part to stress and stress does seem to exacerbate it.  She has no chest pain or palpitations.  She has no new headaches or dizziness.  She is having vision changes that may be related to retinal detachment and has an appointment with ophthalmology after appointment here today.  She does have a family history also of hypertension.    She had been on Lasix and lisinopril for quite a while.  Not sure how well her blood pressure was controlled.  Takes both at night before she goes to bed.  Does report a chronic cough that she feels is due to lisinopril.  Sometimes has some leg swelling due to heat and feels hot quite a bit of the time especially in the summertime.  Reports that she has always been sensitive to heat.  Not sure why she is particularly on the Lasix.    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  Denies Covid exposures and has no plan to get Covid vaccine.  Does not get the flu vaccine.    Hypothyroidism: She has been taking her levothyroxine each morning.  She does not think she has any new symptoms of hypo or hyper thyroid.  She is fatigued most of the time however and has trouble staying  "awake in the afternoon other than she has to walk her dogs so not able to nap.    She has been on low-dose Lexapro for quite a while for anxiety.  She reports that she does get very agitated at times and irritable and she agrees this is probably associated with anxiety.  She does tend to hold in her feelings and has not seen a counselor in not open right now to seeing one.  She does not think it would be helpful to her.      Heartburn for yrs-couple of times in 2 weeks.  She has never had this evaluated nor does she want to at this time.  She does not feel that is significant.  Denies melena or changes in bowels.      Objective   Vital Signs:   /87   Pulse 68   Temp 97.3 °F (36.3 °C)   Ht 167.6 cm (66\")   Wt 104 kg (230 lb 4.8 oz)   SpO2 95%   BMI 37.17 kg/m²     Physical Exam  Vitals and nursing note reviewed.   Constitutional:       General: She is not in acute distress.     Appearance: She is well-developed. She is not ill-appearing or diaphoretic.   HENT:      Head: Normocephalic and atraumatic.      Right Ear: Tympanic membrane, ear canal and external ear normal.      Left Ear: Tympanic membrane, ear canal and external ear normal.      Mouth/Throat:      Mouth: Mucous membranes are moist.      Pharynx: No posterior oropharyngeal erythema.   Eyes:      General:         Right eye: No discharge.         Left eye: No discharge.      Conjunctiva/sclera: Conjunctivae normal.   Cardiovascular:      Rate and Rhythm: Normal rate and regular rhythm.      Heart sounds: Normal heart sounds.   Pulmonary:      Effort: Pulmonary effort is normal.      Breath sounds: Normal breath sounds.   Abdominal:      General: Bowel sounds are normal.      Palpations: Abdomen is soft.      Tenderness: There is no abdominal tenderness.   Musculoskeletal:         General: No deformity.      Cervical back: Neck supple.      Comments: Gait smooth and steady   Lymphadenopathy:      Cervical: No cervical adenopathy.   Skin:     " General: Skin is warm and dry.   Neurological:      General: No focal deficit present.      Mental Status: She is alert and oriented to person, place, and time.   Psychiatric:         Attention and Perception: Attention and perception normal.         Mood and Affect: Affect is angry and tearful.         Speech: Speech normal.      Comments: Patient seems to be very angry at medical establishment following mother's death.  She is not willing to get counseling to work through this.  We discussed that when she does not allow providers to take good care of her it can create similar scenario which she states she is aware of.        Result Review :            ECG 12 Lead    Date/Time: 10/8/2021 10:56 AM  Performed by: Jaelyn Douglas APRN  Authorized by: Jaelyn Douglas APRN   Comparison: not compared with previous ECG   Previous ECG: no previous ECG available  Rhythm: sinus rhythm  Rate: normal  Conduction: conduction normal  QRS axis: normal  Other findings: non-specific ST-T wave changes and left ventricular hypertrophy    Clinical impression: non-specific ECG              Assessment and Plan    Diagnoses and all orders for this visit:    1. Benign essential HTN (Primary)  -     CBC & Differential  -     Comprehensive Metabolic Panel  -     Microalbumin / Creatinine Urine Ratio - Urine, Clean Catch  -     Aldosterone / Renin Ratio  -     Cortisol - AM  -     losartan-hydrochlorothiazide (Hyzaar) 50-12.5 MG per tablet; Take 1 tablet by mouth Daily.  Dispense: 30 tablet; Refill: 3  -     ECG 12 Lead    2. Adult hypothyroidism  -     TSH    3. Anxiety  -     escitalopram (Lexapro) 20 MG tablet; Take 1 tablet by mouth Daily.  Dispense: 90 tablet; Refill: 1    4. Mixed hyperlipidemia  -     Lipid Panel With LDL / HDL Ratio    5. Elevated glucose  -     Hemoglobin A1c    6. Encounter for screening mammogram for malignant neoplasm of breast  -     Mammo Screening Bilateral With CAD; Future    Hypertension: I feel it is  most likely poorly controlled.  She reports she has had a complete work-up in the past and reports a history of blood pressure from her teenage years on as well as a strong family history.  I am get a do a complete work-up of labs including cortisol.  Since she reports a cough I do not think it is likely the lisinopril but will change to losartan.  Not sure why she is on the Lasix which we discussed previously.  I will DC it in favor of HCTZ added onto losartan.  We will start at midrange dose of losartan HCTZ but discussed with patient that most likely will need to be increased to 2 tabs per day.  I strongly recommend she either come to the office for blood pressure checks or check at home and let me know how it is running.  Importance of blood pressure control discussed with patient.  If she does not have any abnormalities in her labs I am going to order a renal ultrasound to be complete.  Patient is aware of this.  EKG most likely with LVH due to uncontrolled hypertension.    Hypothyroidism: We will check a TSH today.  Seems to be stable in the past.    Anxiety: I think this is poorly controlled.  Will increase her Lexapro and she will let me know how she is doing.  She reports a family member did not do well on Cymbalta 1 we discussed switching to Cymbalta for more energy and better control.  Could augment Wellbutrin possibly if Lexapro seems to be working pretty well.  I strongly recommend counseling so that she can receive the care she deserves and help to work through her grief and anger which I discussed with patient.    Hyperlipidemia: Not on a statin.  We will check lipids today.  She has quite a big risk of CVD disease with weight, uncontrolled hypertension and limited activity and high stress job.  May be a statin candidate.    She is willing to get a mammogram today.  She has had her Cologuard done within the last 3 years.    She declines any vaccines today.    I have asked the patient to please  consider letting us take care of her chronic health, despite previous bad experience with her mother's care with other providers.  We discussed various options if she is not able to come to the office for care.    She is aware of side effects from uncontrolled hypertension that require emergent evaluation.            Follow Up   Return in about 6 months (around 4/8/2022) for Recheck.  Patient was given instructions and counseling regarding her condition or for health maintenance advice. Please see specific information pulled into the AVS if appropriate.

## 2021-10-13 ENCOUNTER — TRANSCRIBE ORDERS (OUTPATIENT)
Dept: ADMINISTRATIVE | Facility: HOSPITAL | Age: 61
End: 2021-10-13

## 2021-10-13 DIAGNOSIS — Z12.31 VISIT FOR SCREENING MAMMOGRAM: Primary | ICD-10-CM

## 2021-10-15 LAB
ALBUMIN SERPL-MCNC: 4.4 G/DL (ref 3.5–5.2)
ALBUMIN/CREAT UR: 8 MG/G CREAT (ref 0–29)
ALBUMIN/GLOB SERPL: 1.8 G/DL
ALDOST SERPL-MCNC: 6 NG/DL (ref 0–30)
ALDOST/RENIN PLAS-RTO: 1.5 {RATIO} (ref 0–30)
ALP SERPL-CCNC: 112 U/L (ref 39–117)
ALT SERPL-CCNC: 24 U/L (ref 1–33)
AST SERPL-CCNC: 15 U/L (ref 1–32)
BASOPHILS # BLD AUTO: 0.03 10*3/MM3 (ref 0–0.2)
BASOPHILS NFR BLD AUTO: 0.6 % (ref 0–1.5)
BILIRUB SERPL-MCNC: 0.4 MG/DL (ref 0–1.2)
BUN SERPL-MCNC: 17 MG/DL (ref 8–23)
BUN/CREAT SERPL: 21.3 (ref 7–25)
CALCIUM SERPL-MCNC: 9.8 MG/DL (ref 8.6–10.5)
CHLORIDE SERPL-SCNC: 104 MMOL/L (ref 98–107)
CHOLEST SERPL-MCNC: 198 MG/DL (ref 0–200)
CO2 SERPL-SCNC: 29.2 MMOL/L (ref 22–29)
CORTIS AM PEAK SERPL-MCNC: 5.5 UG/DL (ref 6.2–19.4)
CREAT SERPL-MCNC: 0.8 MG/DL (ref 0.57–1)
CREAT UR-MCNC: 115.7 MG/DL
EOSINOPHIL # BLD AUTO: 0.1 10*3/MM3 (ref 0–0.4)
EOSINOPHIL NFR BLD AUTO: 2 % (ref 0.3–6.2)
ERYTHROCYTE [DISTWIDTH] IN BLOOD BY AUTOMATED COUNT: 12 % (ref 12.3–15.4)
GLOBULIN SER CALC-MCNC: 2.5 GM/DL
GLUCOSE SERPL-MCNC: 105 MG/DL (ref 65–99)
HBA1C MFR BLD: 5.1 % (ref 4.8–5.6)
HCT VFR BLD AUTO: 42.5 % (ref 34–46.6)
HDLC SERPL-MCNC: 38 MG/DL (ref 40–60)
HGB BLD-MCNC: 14.5 G/DL (ref 12–15.9)
IMM GRANULOCYTES # BLD AUTO: 0.01 10*3/MM3 (ref 0–0.05)
IMM GRANULOCYTES NFR BLD AUTO: 0.2 % (ref 0–0.5)
LDLC SERPL CALC-MCNC: 136 MG/DL (ref 0–100)
LDLC/HDLC SERPL: 3.51 {RATIO}
LYMPHOCYTES # BLD AUTO: 1.43 10*3/MM3 (ref 0.7–3.1)
LYMPHOCYTES NFR BLD AUTO: 28.8 % (ref 19.6–45.3)
MCH RBC QN AUTO: 31.6 PG (ref 26.6–33)
MCHC RBC AUTO-ENTMCNC: 34.1 G/DL (ref 31.5–35.7)
MCV RBC AUTO: 92.6 FL (ref 79–97)
MICROALBUMIN UR-MCNC: 9 UG/ML
MONOCYTES # BLD AUTO: 0.35 10*3/MM3 (ref 0.1–0.9)
MONOCYTES NFR BLD AUTO: 7 % (ref 5–12)
NEUTROPHILS # BLD AUTO: 3.05 10*3/MM3 (ref 1.7–7)
NEUTROPHILS NFR BLD AUTO: 61.4 % (ref 42.7–76)
NRBC BLD AUTO-RTO: 0 /100 WBC (ref 0–0.2)
PLATELET # BLD AUTO: 210 10*3/MM3 (ref 140–450)
POTASSIUM SERPL-SCNC: 4.4 MMOL/L (ref 3.5–5.2)
PROT SERPL-MCNC: 6.9 G/DL (ref 6–8.5)
RBC # BLD AUTO: 4.59 10*6/MM3 (ref 3.77–5.28)
RENIN PLAS-CCNC: 4.04 NG/ML/HR (ref 0.17–5.38)
SODIUM SERPL-SCNC: 142 MMOL/L (ref 136–145)
TRIGL SERPL-MCNC: 133 MG/DL (ref 0–150)
TSH SERPL DL<=0.005 MIU/L-ACNC: 0.34 UIU/ML (ref 0.27–4.2)
VLDLC SERPL CALC-MCNC: 24 MG/DL (ref 5–40)
WBC # BLD AUTO: 4.97 10*3/MM3 (ref 3.4–10.8)

## 2021-10-18 DIAGNOSIS — E03.9 ADULT HYPOTHYROIDISM: ICD-10-CM

## 2021-10-18 RX ORDER — LEVOTHYROXINE SODIUM 0.2 MG/1
200 TABLET ORAL DAILY
Qty: 90 TABLET | Refills: 0 | Status: SHIPPED | OUTPATIENT
Start: 2021-10-18 | End: 2021-10-28 | Stop reason: SDUPTHER

## 2021-10-19 DIAGNOSIS — R82.998 LOW URINARY CORTISOL: Primary | ICD-10-CM

## 2021-10-28 DIAGNOSIS — E03.9 ADULT HYPOTHYROIDISM: ICD-10-CM

## 2021-10-28 DIAGNOSIS — F41.9 ANXIETY: ICD-10-CM

## 2021-10-28 RX ORDER — LEVOTHYROXINE SODIUM 0.2 MG/1
200 TABLET ORAL DAILY
Qty: 90 TABLET | Refills: 1 | Status: SHIPPED | OUTPATIENT
Start: 2021-10-28 | End: 2021-11-19

## 2021-10-28 RX ORDER — ESCITALOPRAM OXALATE 20 MG/1
20 TABLET ORAL DAILY
Qty: 90 TABLET | Refills: 1 | Status: SHIPPED | OUTPATIENT
Start: 2021-10-28 | End: 2022-03-29

## 2021-11-18 DIAGNOSIS — E03.9 ADULT HYPOTHYROIDISM: ICD-10-CM

## 2021-11-19 RX ORDER — LEVOTHYROXINE SODIUM 0.2 MG/1
200 TABLET ORAL DAILY
Qty: 90 TABLET | Refills: 0 | Status: SHIPPED | OUTPATIENT
Start: 2021-11-19 | End: 2022-04-04 | Stop reason: SDUPTHER

## 2021-12-03 ENCOUNTER — HOSPITAL ENCOUNTER (OUTPATIENT)
Dept: MAMMOGRAPHY | Facility: HOSPITAL | Age: 61
End: 2021-12-03

## 2021-12-15 RX ORDER — AMLODIPINE BESYLATE 5 MG/1
5 TABLET ORAL DAILY
Qty: 30 TABLET | Refills: 0 | Status: SHIPPED | OUTPATIENT
Start: 2021-12-15 | End: 2022-01-24 | Stop reason: SDUPTHER

## 2021-12-15 RX ORDER — AMLODIPINE BESYLATE 5 MG/1
5 TABLET ORAL DAILY
Qty: 90 TABLET | OUTPATIENT
Start: 2021-12-15

## 2021-12-23 ENCOUNTER — HOSPITAL ENCOUNTER (OUTPATIENT)
Dept: MAMMOGRAPHY | Facility: HOSPITAL | Age: 61
End: 2021-12-23

## 2021-12-29 ENCOUNTER — OFFICE VISIT (OUTPATIENT)
Dept: FAMILY MEDICINE CLINIC | Facility: CLINIC | Age: 61
End: 2021-12-29

## 2021-12-29 VITALS
TEMPERATURE: 96.2 F | DIASTOLIC BLOOD PRESSURE: 84 MMHG | WEIGHT: 237 LBS | HEIGHT: 66 IN | SYSTOLIC BLOOD PRESSURE: 146 MMHG | OXYGEN SATURATION: 99 % | BODY MASS INDEX: 38.09 KG/M2 | HEART RATE: 62 BPM

## 2021-12-29 DIAGNOSIS — I11.9 HYPERTENSIVE LEFT VENTRICULAR HYPERTROPHY, WITHOUT HEART FAILURE: ICD-10-CM

## 2021-12-29 DIAGNOSIS — I10 ESSENTIAL (PRIMARY) HYPERTENSION: Primary | ICD-10-CM

## 2021-12-29 PROCEDURE — 99214 OFFICE O/P EST MOD 30 MIN: CPT | Performed by: NURSE PRACTITIONER

## 2021-12-29 RX ORDER — LOSARTAN POTASSIUM AND HYDROCHLOROTHIAZIDE 25; 100 MG/1; MG/1
1 TABLET ORAL DAILY
Qty: 90 TABLET | Refills: 0 | Status: SHIPPED | OUTPATIENT
Start: 2021-12-29 | End: 2022-02-09

## 2021-12-29 NOTE — PROGRESS NOTES
"Chief Complaint  Hypertension (c/o elevated bp)    Subjective          Christy You presents to [facility]  History of Present Illness pt returns today for HTN.  BP has been running high at home.  Had increased her losartan-HCTZ to 2 tabs and still was running above 170 SBP and around 100 DBP.  Started amlodipine appr 2 weeks ago.  Taking in evening.  Since starting amlodipine has gone back down to 1 losartan-HCTZ.  Has not checked it recently at home but can.      May have had a mild increase in leg swelling since starting amlodipine, but not severe.     Has not noticed any side effects of lexapro, does not feel much difference.       Objective   Vital Signs:   /84   Pulse 62   Temp 96.2 °F (35.7 °C)   Ht 167.6 cm (66\")   Wt 108 kg (237 lb)   SpO2 99%   BMI 38.25 kg/m²     Physical Exam  Vitals and nursing note reviewed.   Constitutional:       General: She is not in acute distress.     Appearance: She is well-developed. She is not ill-appearing or diaphoretic.   HENT:      Head: Normocephalic and atraumatic.   Eyes:      General:         Right eye: No discharge.         Left eye: No discharge.      Conjunctiva/sclera: Conjunctivae normal.   Cardiovascular:      Rate and Rhythm: Normal rate and regular rhythm.      Heart sounds: Normal heart sounds.   Pulmonary:      Effort: Pulmonary effort is normal.      Breath sounds: Normal breath sounds.   Abdominal:      General: Bowel sounds are normal.      Palpations: Abdomen is soft.      Tenderness: There is no abdominal tenderness.   Musculoskeletal:         General: No deformity.      Comments: Gait smooth and steady   Skin:     General: Skin is warm and dry.   Neurological:      Mental Status: She is alert and oriented to person, place, and time.   Psychiatric:         Mood and Affect: Mood normal.         Behavior: Behavior normal.        Result Review :                 Assessment and Plan    Diagnoses and all orders for " this visit:    1. Essential (primary) hypertension (Primary)  -     losartan-hydrochlorothiazide (Hyzaar) 100-25 MG per tablet; Take 1 tablet by mouth Daily.  Dispense: 90 tablet; Refill: 0  -     Ambulatory Referral to Cardiology    2. Hypertensive left ventricular hypertrophy, without heart failure  -     Ambulatory Referral to Cardiology      HTN not well controlled.  Will increase losartan-HCTZ rather than amlodipine to avoid worsening leg swelling and possible non compliance.  Will have her take amlodipine at HS and losartan-HCTZ in am.  Recommend home BP monitoring and let me know how it is running.  If BP drops too low she has the option to hold HS dose amlodipine and let me know.  I dont think this will be the case.  Side effects both reviewed.      I think it best she get est with cardiology and she is now willing to do this.  Will refer.  Previous EKG showed mild LVH.     Will need labs at f/u to monitor electrolytes and renal fx.             Follow Up   No follow-ups on file.  Patient was given instructions and counseling regarding her condition or for health maintenance advice. Please see specific information pulled into the AVS if appropriate.       Answers for HPI/ROS submitted by the patient on 12/27/2021  What is the primary reason for your visit?: High Blood Pressure

## 2022-01-23 DIAGNOSIS — I10 BENIGN ESSENTIAL HTN: ICD-10-CM

## 2022-01-24 RX ORDER — LOSARTAN POTASSIUM AND HYDROCHLOROTHIAZIDE 12.5; 5 MG/1; MG/1
1 TABLET ORAL DAILY
Qty: 30 TABLET | Refills: 3 | OUTPATIENT
Start: 2022-01-24

## 2022-01-24 RX ORDER — AMLODIPINE BESYLATE 5 MG/1
5 TABLET ORAL DAILY
Qty: 30 TABLET | Refills: 0 | OUTPATIENT
Start: 2022-01-24

## 2022-01-24 RX ORDER — AMLODIPINE BESYLATE 5 MG/1
5 TABLET ORAL DAILY
Qty: 30 TABLET | Refills: 0 | Status: SHIPPED | OUTPATIENT
Start: 2022-01-24 | End: 2022-02-09 | Stop reason: SDDI

## 2022-02-09 ENCOUNTER — OFFICE VISIT (OUTPATIENT)
Dept: FAMILY MEDICINE CLINIC | Facility: CLINIC | Age: 62
End: 2022-02-09

## 2022-02-09 VITALS
BODY MASS INDEX: 38.27 KG/M2 | HEIGHT: 66 IN | OXYGEN SATURATION: 99 % | SYSTOLIC BLOOD PRESSURE: 147 MMHG | WEIGHT: 238.1 LBS | DIASTOLIC BLOOD PRESSURE: 81 MMHG | HEART RATE: 96 BPM | TEMPERATURE: 96.2 F

## 2022-02-09 DIAGNOSIS — I10 BENIGN ESSENTIAL HTN: Primary | ICD-10-CM

## 2022-02-09 DIAGNOSIS — Z53.20 STATIN MEDICATION DECLINED BY PATIENT: ICD-10-CM

## 2022-02-09 DIAGNOSIS — E78.2 MIXED HYPERLIPIDEMIA: ICD-10-CM

## 2022-02-09 PROCEDURE — 99214 OFFICE O/P EST MOD 30 MIN: CPT | Performed by: NURSE PRACTITIONER

## 2022-02-09 RX ORDER — VALSARTAN AND HYDROCHLOROTHIAZIDE 160; 12.5 MG/1; MG/1
1 TABLET, FILM COATED ORAL DAILY
Qty: 30 TABLET | Refills: 1 | Status: SHIPPED | OUTPATIENT
Start: 2022-02-09 | End: 2022-03-29 | Stop reason: SINTOL

## 2022-02-09 NOTE — TELEPHONE ENCOUNTER
Rx Refill Note  Requested Prescriptions     Pending Prescriptions Disp Refills   • valsartan-hydrochlorothiazide (DIOVAN-HCT) 160-12.5 MG per tablet [Pharmacy Med Name: VALSARTAN/HCTZ 160MG/12.5MG TABLETS] 90 tablet      Sig: TAKE 1 TABLET BY MOUTH DAILY      Last office visit with prescribing clinician: 2/9/2022      Next office visit with prescribing clinician: 5/12/2022            Melia Segal MA  02/09/22, 15:24 EST

## 2022-02-09 NOTE — PROGRESS NOTES
"Chief Complaint  Hypertension (6 wk f/u htn )    Subjective          Christy You presents to [facility]  History of Present Illness here for HTN f/u.  She has been taking   Losartan-HCTZ daily.  Ran out of amlodipine appr 1 month ago.  BP is 161/94 at home typically.  She is  so at work more and sitting at desk and stressed.  Walks dogs much more so in warmer months.    Has cardiology eval in 2 weeks.  Reports she can feel her heart beating at night for short time when she lays down and then goes away.  Still has intermittent pressure in her chest that occurs with or without exertion and resolves on its own. Short duration.  No associated sx.     HLD:  Does not want to take statin.  We discussed most recent labs and calculated ASCVD showing her to be in statin benefit group and significant reduction in risk if statin added.  She wants to try more activity jose with warmer weather upcoming.     No cough on losartan.        Objective   Vital Signs:   /81   Pulse 96   Temp 96.2 °F (35.7 °C)   Ht 167.6 cm (66\")   Wt 108 kg (238 lb 1.6 oz)   SpO2 99%   BMI 38.43 kg/m²     Physical Exam  Vitals and nursing note reviewed.   Constitutional:       General: She is not in acute distress.     Appearance: She is well-developed. She is not ill-appearing or diaphoretic.   HENT:      Head: Normocephalic and atraumatic.   Eyes:      General:         Right eye: No discharge.         Left eye: No discharge.      Conjunctiva/sclera: Conjunctivae normal.   Cardiovascular:      Rate and Rhythm: Normal rate and regular rhythm.      Heart sounds: Normal heart sounds.   Pulmonary:      Effort: Pulmonary effort is normal.      Breath sounds: Normal breath sounds.   Abdominal:      General: Bowel sounds are normal.      Palpations: Abdomen is soft.      Tenderness: There is no abdominal tenderness.   Musculoskeletal:         General: No deformity.      Comments: Gait smooth and steady "   Skin:     General: Skin is warm and dry.   Neurological:      General: No focal deficit present.      Mental Status: She is alert and oriented to person, place, and time.   Psychiatric:         Mood and Affect: Mood normal.         Behavior: Behavior normal.      Comments: Very pleasant, conversant        Result Review :                 Assessment and Plan    Diagnoses and all orders for this visit:    1. Benign essential HTN (Primary)  -     valsartan-hydrochlorothiazide (Diovan HCT) 160-12.5 MG per tablet; Take 1 tablet by mouth Daily.  Dispense: 30 tablet; Refill: 1    2. Mixed hyperlipidemia    3. Statin medication declined by patient      HTN:  She is monitoring at home.  Does not seem to be well controlled.  To simplify meds will switch to valsartan-HCTZ.  I will have her take 1/2 tab daily for a couple days and if BP elevated and she is not having side effects, she will increase to whole.   Will hold off on amlodipine for now-may not need.      HLD:  Discussed ASCVD and risk scores.  Pt declines statin.  Prefers to try lifestyle changes. Recheck at physical in summer.      We will need to try to modify all other CVD risks to mitigate risk since no statin.  Pt understands risk and is willing to work to get BP controlled.      Has cardiology appt in appr 2 weeks for eval.        She will let me know how BP is running at home.           Follow Up   Return in about 3 months (around 5/9/2022).  Patient was given instructions and counseling regarding her condition or for health maintenance advice. Please see specific information pulled into the AVS if appropriate.

## 2022-02-10 RX ORDER — VALSARTAN AND HYDROCHLOROTHIAZIDE 160; 12.5 MG/1; MG/1
1 TABLET, FILM COATED ORAL DAILY
Qty: 90 TABLET | OUTPATIENT
Start: 2022-02-10

## 2022-02-21 ENCOUNTER — OFFICE VISIT (OUTPATIENT)
Dept: CARDIOLOGY | Facility: CLINIC | Age: 62
End: 2022-02-21

## 2022-02-21 VITALS
HEIGHT: 66 IN | WEIGHT: 235 LBS | BODY MASS INDEX: 37.77 KG/M2 | DIASTOLIC BLOOD PRESSURE: 86 MMHG | HEART RATE: 71 BPM | SYSTOLIC BLOOD PRESSURE: 124 MMHG

## 2022-02-21 DIAGNOSIS — R07.2 PRECORDIAL PAIN: ICD-10-CM

## 2022-02-21 DIAGNOSIS — R06.02 SOB (SHORTNESS OF BREATH): ICD-10-CM

## 2022-02-21 DIAGNOSIS — R00.2 PALPITATIONS: ICD-10-CM

## 2022-02-21 DIAGNOSIS — I10 BENIGN ESSENTIAL HTN: Primary | ICD-10-CM

## 2022-02-21 PROCEDURE — 99204 OFFICE O/P NEW MOD 45 MIN: CPT | Performed by: INTERNAL MEDICINE

## 2022-02-21 PROCEDURE — 93000 ELECTROCARDIOGRAM COMPLETE: CPT | Performed by: INTERNAL MEDICINE

## 2022-02-21 NOTE — PROGRESS NOTES
"    Subjective:     Encounter Date:02/21/22      Patient ID: Christy You is a 61 y.o. female.    Chief Complaint:  History of Present Illness    Dear Dr. Douglas,    I had the pleasure of seeing this patient in the office today for initial evaluation and consultation.  I appreciate that you sent her in to see us.  They come in today to be seen for evaluation of HTN. She also has been having palpitation jose at night.    No known cardiac history.    Her BP has been incompletely controlled.  She is currently on valsartan HCTZ 160/12.5. She has been on amlodipine in the past, but had been off it for about a month when she recently saw you. However, she says a few days ago the pharmacy sent her more, so she thought that meant she was supposed to go back on it so she did.    No CP. Occasional PINTO when she is walking.    She has not had COVID, and is not vaccinated. With her history of Hashimoto's she is worried about the autoimmune aspect.    The following portions of the patient's history were reviewed and updated as appropriate: allergies, current medications, past family history, past medical history, past social history, past surgical history and problem list.      ECG 12 Lead    Date/Time: 2/21/2022 9:27 AM  Performed by: Nazario Henderson III, MD  Authorized by: Nazario Henderson III, MD   Comparison: compared with previous ECG   Similar to previous ECG  Rhythm: sinus rhythm  Rate: normal  Conduction: conduction normal  QRS axis: normal  Other findings: non-specific ST-T wave changes    Clinical impression: non-specific ECG               Objective:     Vitals:    02/21/22 0826   BP: 124/86   BP Location: Left arm   Pulse: 71   Weight: 107 kg (235 lb)   Height: 167.6 cm (66\")     Body mass index is 37.93 kg/m².      Vitals reviewed.   Constitutional:       General: Not in acute distress.     Appearance: Well-developed. Not diaphoretic.   Eyes:      General:         Right eye: No discharge.         Left eye: No discharge. "      Conjunctiva/sclera: Conjunctivae normal.      Pupils: Pupils are equal, round, and reactive to light.   HENT:      Head: Normocephalic and atraumatic.      Nose: Nose normal.   Neck:      Thyroid: No thyromegaly.      Trachea: No tracheal deviation.      Lymphadenopathy: No cervical adenopathy.   Pulmonary:      Effort: Pulmonary effort is normal. No respiratory distress.      Breath sounds: Normal breath sounds. No stridor.   Chest:      Chest wall: Not tender to palpatation.   Cardiovascular:      Normal rate. Regular rhythm.      Murmurs: There is no murmur.      . No S3 gallop. No click. No rub.   Pulses:     Intact distal pulses.   Abdominal:      General: Bowel sounds are normal. There is no distension.      Palpations: Abdomen is soft. There is no abdominal mass.      Tenderness: There is no abdominal tenderness. There is no guarding or rebound.   Musculoskeletal: Normal range of motion.         General: No tenderness or deformity.      Cervical back: Normal range of motion and neck supple. Skin:     General: Skin is warm and dry.      Findings: No erythema or rash.   Neurological:      Mental Status: Alert and oriented to person, place, and time.      Deep Tendon Reflexes: Reflexes are normal and symmetric.   Psychiatric:         Thought Content: Thought content normal.         Data and records reviewed:     Lab Results   Component Value Date    GLUCOSE 105 (H) 10/08/2021    BUN 17 10/08/2021    CREATININE 0.80 10/08/2021    EGFRIFNONA 72 10/08/2021    EGFRIFAFRI 88 10/08/2021    BCR 21.3 10/08/2021    K 4.4 10/08/2021    CO2 29.2 (H) 10/08/2021    CALCIUM 9.8 10/08/2021    PROTENTOTREF 6.9 10/08/2021    ALBUMIN 4.40 10/08/2021    LABIL2 1.8 10/08/2021    AST 15 10/08/2021    ALT 24 10/08/2021     Lab Results   Component Value Date    CHOL 188 12/10/2020     Lab Results   Component Value Date    TRIG 133 10/08/2021    TRIG 149 12/10/2020    TRIG 300 (H) 07/16/2019     Lab Results   Component Value Date     HDL 38 (L) 10/08/2021    HDL 39 (L) 12/10/2020    HDL 36 (L) 07/16/2019     Lab Results   Component Value Date     (H) 10/08/2021     (H) 12/10/2020     (H) 07/16/2019     Lab Results   Component Value Date    VLDL 24 10/08/2021    VLDL 27 12/10/2020    VLDL 60 07/16/2019     Lab Results   Component Value Date    LDLHDL 3.51 10/08/2021    LDLHDL 3.06 12/10/2020    LDLHDL 3.28 07/16/2019     CBC    CBC 10/8/21   WBC 4.97   RBC 4.59   Hemoglobin 14.5   Hematocrit 42.5   MCV 92.6   MCH 31.6   MCHC 34.1   RDW 12.0 (A)   Platelets 210   (A) Abnormal value            No radiology results for the last 90 days.          Assessment:          Diagnosis Plan   1. Benign essential HTN  Adult Transthoracic Echo Complete W/ Cont if Necessary Per Protocol    Holter Monitor - 24 Hour    ECG 12 Lead   2. SOB (shortness of breath)  Adult Transthoracic Echo Complete W/ Cont if Necessary Per Protocol    Holter Monitor - 24 Hour    ECG 12 Lead   3. Precordial pain  Adult Transthoracic Echo Complete W/ Cont if Necessary Per Protocol    Holter Monitor - 24 Hour    ECG 12 Lead   4. Palpitations  Adult Transthoracic Echo Complete W/ Cont if Necessary Per Protocol    Holter Monitor - 24 Hour    ECG 12 Lead          Plan:       1. HTN- I'll have her stop the amlodipine since she was not supposed to be on it, keep a BP log, then report back next week.  2. Palpitations- will order a holter  3. PINTO- fairly mild, only when walking her dogs, will check an echo  Thank you very much for allowing us to participate in the care of this pleasant patient.  Please don't hesitate to call if I can be of assistance in any way.      Current Outpatient Medications:   •  escitalopram (Lexapro) 20 MG tablet, Take 1 tablet by mouth Daily., Disp: 90 tablet, Rfl: 1  •  levothyroxine (SYNTHROID, LEVOTHROID) 200 MCG tablet, TAKE 1 TABLET BY MOUTH DAILY, Disp: 90 tablet, Rfl: 0  •  valsartan-hydrochlorothiazide (Diovan HCT) 160-12.5 MG per  tablet, Take 1 tablet by mouth Daily., Disp: 30 tablet, Rfl: 1         No follow-ups on file.

## 2022-03-11 RX ORDER — AMLODIPINE BESYLATE 5 MG/1
5 TABLET ORAL DAILY
Qty: 90 TABLET | OUTPATIENT
Start: 2022-03-11

## 2022-03-11 NOTE — TELEPHONE ENCOUNTER
Rx Refill Note  Requested Prescriptions     Pending Prescriptions Disp Refills   • amLODIPine (NORVASC) 5 MG tablet [Pharmacy Med Name: AMLODIPINE BESYLATE 5MG TABLETS] 90 tablet      Sig: TAKE 1 TABLET BY MOUTH DAILY      Last office visit with prescribing clinician: 2/9/2022      Next office visit with prescribing clinician: 5/12/2022            Melia Segal MA  03/11/22, 11:18 EST

## 2022-03-27 DIAGNOSIS — I10 ESSENTIAL (PRIMARY) HYPERTENSION: ICD-10-CM

## 2022-03-28 RX ORDER — LOSARTAN POTASSIUM AND HYDROCHLOROTHIAZIDE 25; 100 MG/1; MG/1
1 TABLET ORAL DAILY
Qty: 90 TABLET | Refills: 0 | OUTPATIENT
Start: 2022-03-28

## 2022-03-29 ENCOUNTER — OFFICE VISIT (OUTPATIENT)
Dept: FAMILY MEDICINE CLINIC | Facility: CLINIC | Age: 62
End: 2022-03-29

## 2022-03-29 VITALS
TEMPERATURE: 96.6 F | HEIGHT: 66 IN | BODY MASS INDEX: 37.77 KG/M2 | SYSTOLIC BLOOD PRESSURE: 168 MMHG | WEIGHT: 235 LBS | DIASTOLIC BLOOD PRESSURE: 92 MMHG | OXYGEN SATURATION: 98 % | HEART RATE: 70 BPM

## 2022-03-29 DIAGNOSIS — E03.9 ADULT HYPOTHYROIDISM: ICD-10-CM

## 2022-03-29 DIAGNOSIS — I10 BENIGN ESSENTIAL HTN: ICD-10-CM

## 2022-03-29 DIAGNOSIS — F41.9 ANXIETY: ICD-10-CM

## 2022-03-29 DIAGNOSIS — I10 BENIGN ESSENTIAL HTN: Primary | ICD-10-CM

## 2022-03-29 PROBLEM — J01.00 ACUTE NON-RECURRENT MAXILLARY SINUSITIS: Status: RESOLVED | Noted: 2017-09-22 | Resolved: 2022-03-29

## 2022-03-29 PROCEDURE — 99214 OFFICE O/P EST MOD 30 MIN: CPT | Performed by: NURSE PRACTITIONER

## 2022-03-29 RX ORDER — LISINOPRIL AND HYDROCHLOROTHIAZIDE 20; 12.5 MG/1; MG/1
1 TABLET ORAL DAILY
COMMUNITY
End: 2022-03-29 | Stop reason: SINTOL

## 2022-03-29 RX ORDER — ESCITALOPRAM OXALATE 20 MG/1
10 TABLET ORAL DAILY
Qty: 90 TABLET | Refills: 1
Start: 2022-03-29 | End: 2022-04-14

## 2022-03-29 RX ORDER — OLMESARTAN MEDOXOMIL AND HYDROCHLOROTHIAZIDE 40/25 40; 25 MG/1; MG/1
1 TABLET ORAL DAILY
Qty: 30 TABLET | Refills: 1 | Status: SHIPPED | OUTPATIENT
Start: 2022-03-29 | End: 2022-05-12 | Stop reason: SDUPTHER

## 2022-03-29 RX ORDER — OLMESARTAN MEDOXOMIL AND HYDROCHLOROTHIAZIDE 40/25 40; 25 MG/1; MG/1
1 TABLET ORAL DAILY
Qty: 90 TABLET | OUTPATIENT
Start: 2022-03-29

## 2022-03-29 NOTE — PROGRESS NOTES
"Chief Complaint  Hypertension (F/u htn )    Subjective          Christy You presents to [facility]  History of Present Illness here for follow-up on hypertension, anxiety, hypothyroidism.    She had been taking the valsartan but caused tremendous side effects.  She felt pretty terrible while she was taking it and stopped and went back on the lisinopril HCTZ which she knows is not controlling her blood pressure well.  She also has a cough on lisinopril and does want to switch it.  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  She denies chest pain, palpitations, increased dyspnea, cough, leg swelling, dizziness, headache.    Anxiety: With increased to 20 mg of her Lexapro she became obsessed with buying a car which is totally not like her.  She also started eating foods that she typically does not eat such as cereal as she has trouble stopping eating them.  She decreased her dose back down to 10 mg and the 80 obsessive thoughts and excessive eating have gone back down to baseline.  She is feeling okay on the 10 mg of Lexapro for now.    Hypothyroidism: She is taking and tolerating her levothyroxine without signs or symptoms of hyper or hypothyroidism.    PHQ-9 Total Score:  4  JEAN 7 Total Score: 1    Objective   Vital Signs:   /92   Pulse 70   Temp 96.6 °F (35.9 °C)   Ht 167.6 cm (66\")   Wt 107 kg (235 lb)   SpO2 98%   BMI 37.93 kg/m²     BMI is above normal parameters. Recommendations: educational material discussed/shared in after visit summary       Physical Exam  Vitals and nursing note reviewed.   Constitutional:       General: She is not in acute distress.     Appearance: She is well-developed. She is not ill-appearing or diaphoretic.   HENT:      Head: Normocephalic and atraumatic.   Eyes:      General:         Right eye: No discharge.         Left eye: No discharge.      Conjunctiva/sclera: Conjunctivae normal.   Cardiovascular:      Rate " and Rhythm: Normal rate and regular rhythm.      Heart sounds: Normal heart sounds.   Pulmonary:      Effort: Pulmonary effort is normal.      Breath sounds: Normal breath sounds.   Abdominal:      General: Bowel sounds are normal.      Palpations: Abdomen is soft.      Tenderness: There is no abdominal tenderness.   Musculoskeletal:         General: No deformity.      Comments: Gait smooth and steady   Skin:     General: Skin is warm and dry.   Neurological:      General: No focal deficit present.      Mental Status: She is alert and oriented to person, place, and time.   Psychiatric:         Mood and Affect: Mood normal.         Behavior: Behavior normal.      Comments: Very pleasant, conversant        Result Review :{Labs  Result Review  Imaging  Med Tab  Media  Procedures :23}                 Assessment and Plan    Diagnoses and all orders for this visit:    1. Benign essential HTN (Primary)  -     olmesartan-hydrochlorothiazide (BENICAR HCT) 40-25 MG per tablet; Take 1 tablet by mouth Daily.  Dispense: 30 tablet; Refill: 1    2. Anxiety  -     escitalopram (Lexapro) 20 MG tablet; Take 0.5 tablets by mouth Daily.  Dispense: 90 tablet; Refill: 1    3. Adult hypothyroidism      Reviewed cardiology eval: She saw cardiology.  Agreed with BP meds and had her stop the amlodipine as we had previously discussed. She has had a holter and echo ordered which she will get when tax season is over.      HTN:  Did not tolerate valsartan.  Will switch to olmasartan-HCTZ-full dose since BP is so high.  If her BP gets too low, she has any dizziness will have her decrease dose by half.  For now, will have her take 1/2 tab BID to avoid rapid hypotension.  Stop lisinopril-HCTZ.  She is agreeable with plan and will let me know how her BP is doing.  Goal of 130/80 or less.  Side effects reviewed.     Anxiety:  Did not tolerate increase to 20mg.  She is jessenia 10mg and her JEAN is low today.  Will continue current.      Hypothyroid:   Stable.  TSH has been therapeutic.  Will continue current dose.  She does not need refill today.       Will see her back in appr 6 weeks for physical.         Follow Up   Return in about 3 months (around 6/29/2022).  Patient was given instructions and counseling regarding her condition or for health maintenance advice. Please see specific information pulled into the AVS if appropriate.

## 2022-04-04 DIAGNOSIS — E03.9 ADULT HYPOTHYROIDISM: ICD-10-CM

## 2022-04-04 NOTE — TELEPHONE ENCOUNTER
Rx Refill Note  Requested Prescriptions     Pending Prescriptions Disp Refills   • levothyroxine (SYNTHROID, LEVOTHROID) 200 MCG tablet 90 tablet 0     Sig: Take 1 tablet by mouth Daily.      Last office visit with prescribing clinician: 3/29/2022      Next office visit with prescribing clinician: 5/12/2022            Melia Segal MA  04/04/22, 17:11 EDT

## 2022-04-05 RX ORDER — LEVOTHYROXINE SODIUM 0.2 MG/1
200 TABLET ORAL DAILY
Qty: 90 TABLET | Refills: 1 | Status: SHIPPED | OUTPATIENT
Start: 2022-04-05 | End: 2022-10-20 | Stop reason: SDUPTHER

## 2022-04-05 NOTE — TELEPHONE ENCOUNTER
SHAHID WITH [facility] IS CALLING TO CHECK THE STATUS OF THE REQUEST FOR A NEW 90 DAY PRESCRIPTION WITH REFILLS FOR THE FOLLOWING MEDICATION.    levothyroxine (SYNTHROID, LEVOTHROID) 200 MCG tablet    [facility] - [facility] [address] [place] [address] - [phone] [facility] [phone]   [phone]    PLEASE ADVISE.

## 2022-04-14 DIAGNOSIS — F41.9 ANXIETY: ICD-10-CM

## 2022-04-14 RX ORDER — ESCITALOPRAM OXALATE 20 MG/1
TABLET ORAL
Qty: 90 TABLET | Refills: 1 | Status: SHIPPED | OUTPATIENT
Start: 2022-04-14 | End: 2022-05-12 | Stop reason: SDUPTHER

## 2022-04-14 NOTE — TELEPHONE ENCOUNTER
Rx Refill Note  Requested Prescriptions     Pending Prescriptions Disp Refills   • escitalopram (LEXAPRO) 20 MG tablet [Pharmacy Med Name: ESCITALOPRAM 20MG TABLETS] 90 tablet 1     Sig: TAKE 1 TABLET BY MOUTH DAILY      Last office visit with prescribing clinician: 3/29/2022      Next office visit with prescribing clinician: 5/12/2022            Maryanne Perry MA  04/14/22, 07:37 EDT

## 2022-05-12 ENCOUNTER — OFFICE VISIT (OUTPATIENT)
Dept: FAMILY MEDICINE CLINIC | Facility: CLINIC | Age: 62
End: 2022-05-12

## 2022-05-12 VITALS
HEART RATE: 77 BPM | OXYGEN SATURATION: 97 % | DIASTOLIC BLOOD PRESSURE: 83 MMHG | SYSTOLIC BLOOD PRESSURE: 132 MMHG | TEMPERATURE: 97.5 F | HEIGHT: 66 IN | BODY MASS INDEX: 37.93 KG/M2

## 2022-05-12 DIAGNOSIS — E03.9 ADULT HYPOTHYROIDISM: ICD-10-CM

## 2022-05-12 DIAGNOSIS — K04.7 DENTAL INFECTION: ICD-10-CM

## 2022-05-12 DIAGNOSIS — I10 ESSENTIAL (PRIMARY) HYPERTENSION: Primary | ICD-10-CM

## 2022-05-12 DIAGNOSIS — Z71.3 ENCOUNTER FOR WEIGHT LOSS COUNSELING: ICD-10-CM

## 2022-05-12 DIAGNOSIS — F41.9 ANXIETY: ICD-10-CM

## 2022-05-12 PROCEDURE — 99214 OFFICE O/P EST MOD 30 MIN: CPT | Performed by: NURSE PRACTITIONER

## 2022-05-12 RX ORDER — ESCITALOPRAM OXALATE 20 MG/1
10 TABLET ORAL DAILY
Qty: 90 TABLET | Refills: 1
Start: 2022-05-12 | End: 2022-10-20

## 2022-05-12 RX ORDER — AMOXICILLIN 875 MG/1
875 TABLET, COATED ORAL 2 TIMES DAILY
Qty: 14 TABLET | Refills: 0 | Status: SHIPPED | OUTPATIENT
Start: 2022-05-12 | End: 2022-05-19

## 2022-05-12 RX ORDER — OLMESARTAN MEDOXOMIL AND HYDROCHLOROTHIAZIDE 40/25 40; 25 MG/1; MG/1
1 TABLET ORAL DAILY
Qty: 90 TABLET | Refills: 1 | Status: SHIPPED | OUTPATIENT
Start: 2022-05-12 | End: 2022-11-28

## 2022-05-12 NOTE — PROGRESS NOTES
"Chief Complaint  Hypertension (6 month f/u htn )    Subjective          Christy You presents to [facility]  History of Present Illness here for HTN f/u.  Has been taking and jessenia olmesartan-HCTZ at full dose. BP has been about what it is today.  She denies side effects.      Taking and tolerating lexapro without side effects at 10 mg dose.  Did not tolerate 20mg.      Hypothyroidism:  Taking and tolerating without s/s hyper/hypothyroidism.  Has never been able to lose weight.  Reports that even when she was a young child at the age of 3 she was being taken to bariatrics for evaluation.  Has never had weight loss surgery.  She does also admit that obesity runs in her family.    She walks nightly a half a mile with her dog.  Tries to follow a pretty healthy diet.  She does have quite a bit of stress in job and works sometimes very long hours.    Recently had left lower molar tooth extraction.  Initially tolerated well but now she is having some left anterior cervical lymphadenopathy as well as pain in her left neck, jaw that seems to be radiating to her ear.  She went back to the dentist for reevaluation who did not see any evidence of infection.  She has no fever or chills.      Objective   Vital Signs:  /83   Pulse 77   Temp 97.5 °F (36.4 °C)   Ht 167.6 cm (66\")   SpO2 97%   BMI 37.93 kg/m²     Class 2 Severe Obesity (BMI >=35 and <=39.9). Obesity-related health conditions include the following: hypertension. Obesity is unchanged. BMI is is above average; BMI management plan is completed. We discussed portion control, increasing exercise and pharmacologic options including discussed weight loss medications.      Physical Exam  Vitals and nursing note reviewed.   Constitutional:       General: She is not in acute distress.     Appearance: She is well-developed. She is not ill-appearing or diaphoretic.   HENT:      Head: Normocephalic and atraumatic.      Right Ear: Ear canal " and external ear normal. A middle ear effusion is present. Tympanic membrane is not erythematous.      Left Ear: Tympanic membrane, ear canal and external ear normal.      Mouth/Throat:      Mouth: Mucous membranes are moist.      Pharynx: No posterior oropharyngeal erythema.      Comments: Left lower socket does not have any obvious signs of infection  Eyes:      General:         Right eye: No discharge.         Left eye: No discharge.      Conjunctiva/sclera: Conjunctivae normal.   Cardiovascular:      Rate and Rhythm: Normal rate and regular rhythm.   Pulmonary:      Effort: Pulmonary effort is normal.      Breath sounds: Normal breath sounds.   Abdominal:      General: Bowel sounds are normal.      Palpations: Abdomen is soft.      Tenderness: There is no abdominal tenderness.   Musculoskeletal:         General: No deformity.      Cervical back: Neck supple.      Comments: Gait smooth and steady   Lymphadenopathy:      Cervical: Cervical adenopathy (Left) present.   Skin:     General: Skin is warm and dry.   Neurological:      General: No focal deficit present.      Mental Status: She is alert and oriented to person, place, and time.   Psychiatric:         Mood and Affect: Mood normal.         Behavior: Behavior normal.        Result Review :                 Assessment and Plan    Diagnoses and all orders for this visit:    1. Essential (primary) hypertension (Primary)  -     Basic Metabolic Panel  -     olmesartan-hydrochlorothiazide (BENICAR HCT) 40-25 MG per tablet; Take 1 tablet by mouth Daily.  Dispense: 90 tablet; Refill: 1    2. Adult hypothyroidism    3. Anxiety  -     escitalopram (LEXAPRO) 20 MG tablet; Take 0.5 tablets by mouth Daily.  Dispense: 90 tablet; Refill: 1    4. Dental infection  -     amoxicillin (AMOXIL) 875 MG tablet; Take 1 tablet by mouth 2 (Two) Times a Day for 7 days.  Dispense: 14 tablet; Refill: 0    5. Encounter for weight loss counseling      Hypertension: Her blood pressure is  well controlled on current medication.  She is feeling well on it.  We will get a BMP to check renal function as well as electrolytes.    Anxiety: She is doing well on Lexapro 10 mg.  She feels that it is helpful and would like to continue.    Hypothyroid: We discussed difficulty losing weight.  Her last TSH was therapeutic and on the lower side of normal.  I do not think we could decrease her thyroid medication anymore without risking hyperthyroid.  We will continue current medication.    Obesity: We discussed weight loss medication options as well as diet and exercise.  She will consider her options and let me know if she chooses to start medication.           Follow Up   Return in about 6 months (around 11/12/2022) for Annual physical.  Patient was given instructions and counseling regarding her condition or for health maintenance advice. Please see specific information pulled into the AVS if appropriate.       Answers for HPI/ROS submitted by the patient on 5/5/2022  What is the primary reason for your visit?: High Blood Pressure

## 2022-08-01 ENCOUNTER — TELEMEDICINE (OUTPATIENT)
Dept: FAMILY MEDICINE CLINIC | Facility: TELEHEALTH | Age: 62
End: 2022-08-01

## 2022-08-01 ENCOUNTER — OFFICE VISIT (OUTPATIENT)
Dept: FAMILY MEDICINE CLINIC | Facility: CLINIC | Age: 62
End: 2022-08-01

## 2022-08-01 ENCOUNTER — LAB (OUTPATIENT)
Dept: LAB | Facility: HOSPITAL | Age: 62
End: 2022-08-01

## 2022-08-01 VITALS
OXYGEN SATURATION: 99 % | WEIGHT: 232 LBS | HEART RATE: 68 BPM | HEIGHT: 66 IN | SYSTOLIC BLOOD PRESSURE: 143 MMHG | BODY MASS INDEX: 37.28 KG/M2 | DIASTOLIC BLOOD PRESSURE: 85 MMHG | TEMPERATURE: 96.9 F

## 2022-08-01 DIAGNOSIS — B34.9 VIRAL ILLNESS: Primary | ICD-10-CM

## 2022-08-01 DIAGNOSIS — B34.9 VIRAL ILLNESS: ICD-10-CM

## 2022-08-01 DIAGNOSIS — R68.89 FLU-LIKE SYMPTOMS: Primary | ICD-10-CM

## 2022-08-01 PROCEDURE — 86694 HERPES SIMPLEX NES ANTBDY: CPT

## 2022-08-01 PROCEDURE — 86665 EPSTEIN-BARR CAPSID VCA: CPT

## 2022-08-01 PROCEDURE — 99213 OFFICE O/P EST LOW 20 MIN: CPT | Performed by: NURSE PRACTITIONER

## 2022-08-01 PROCEDURE — 36415 COLL VENOUS BLD VENIPUNCTURE: CPT

## 2022-08-01 NOTE — PROGRESS NOTES
"Chief Complaint  Sore Throat (C/o sore throat, R earache, swollen glands, since Saturday )    Subjective    {Problem List  Visit Diagnosis   Encounters  Notes  Medications  Labs  Result Review Imaging  Media :23}    Christy You presents to [facility]  History of Present Illness    The patient presents today for a sick visit.    The patient believes that she is experiencing a flare up of Ondina-Barr virus since her symptoms always present in the same way. Her symptoms include scalp tenderness that radiates from the middle of her head and down to ear. Sometimes, she gets a fever, blister, or swelling. She also experiences pharyngitis, right otalgia, and myalgia. Her right ear feels swollen, and itchy. Her symptoms are mainly present on her right side. Her throat is sore on the right side. Her scalp is tender on the right side. Her lymph nodes are swollen on the right side as well. She states that myalgia is a new symptom. The patient had a fever over the weekend, but it did not last long. She notes that she usually does not get fevers. She denies coughing or shortness of breath. Occasionally, she has trouble swallowing. She denies any diarrhea.   The patient had a telehealth UC visit today and was advised to do laboratory work to confirm that she has Ondina-Barr virus. When she was 29 years old, she was hospitalized for a week and treated with multiple rounds of steroids. She was then told that it would live in her body and that she would have flare-ups at times. For her current symptoms, she has been taking Advil and staying well-hydrated.        Objective   Vital Signs:  /85   Pulse 68   Temp 96.9 °F (36.1 °C)   Ht 167.6 cm (66\")   Wt 105 kg (232 lb)   SpO2 99%   BMI 37.45 kg/m²   Estimated body mass index is 37.45 kg/m² as calculated from the following:    Height as of this encounter: 167.6 cm (66\").    Weight as of this encounter: 105 kg (232 " lb).          Physical Exam  Vitals and nursing note reviewed.   Constitutional:       General: She is not in acute distress.     Appearance: She is well-developed. She is not ill-appearing.   HENT:      Head: Normocephalic and atraumatic.      Right Ear: Ear canal and external ear normal. Tenderness (ear is mildly tender with manipulation, small vesicles in canal) present. Tympanic membrane is erythematous. Tympanic membrane is not bulging.      Left Ear: Ear canal and external ear normal. Tympanic membrane is erythematous. Tympanic membrane is not bulging.      Mouth/Throat:      Pharynx: Uvula midline.   Eyes:      General:         Right eye: No discharge.         Left eye: No discharge.      Conjunctiva/sclera: Conjunctivae normal.      Pupils: Pupils are equal, round, and reactive to light.   Neck:      Thyroid: No thyromegaly.   Cardiovascular:      Rate and Rhythm: Normal rate and regular rhythm.   Pulmonary:      Effort: Pulmonary effort is normal.      Breath sounds: Normal breath sounds.   Abdominal:      General: Bowel sounds are normal.      Palpations: Abdomen is soft.      Tenderness: There is no abdominal tenderness.   Musculoskeletal:         General: No deformity.      Cervical back: Neck supple.      Comments: Gait smooth and steady   Lymphadenopathy:      Cervical: Cervical adenopathy (right anterior cervical) present.   Skin:     General: Skin is warm and dry.   Neurological:      General: No focal deficit present.      Mental Status: She is alert and oriented to person, place, and time.   Psychiatric:         Mood and Affect: Mood normal.         Behavior: Behavior normal.        Result Review :                Assessment and Plan   Diagnoses and all orders for this visit:    1. Flu-like symptoms (Primary)  -     Ondina-Barr Virus VCA, IgM; Future  -     HSV Non-Specific Antibody, IgM; Future  -     COVID-19,[facility] ROUTINE, NP/OP SWAB IN TRANSPORT MEDIA OR ESWAB 72 HR TAT - Swab, Nasopharynx  -      SARS-CoV-2, [place] 2 DAY TAT - ,    Patient believes that she has Ondina-Barr virus. I suspect she has HSV infection.  We will get her swabbed for COVID1-19 today as well.      Will get an IgM for EBV, HSV. She was advised to stay home until we get the test results. I have provided her with an excuse note.  If worsening let me know    Either way likely viral and so abx not helpful.          Follow Up   No follow-ups on file.  Patient was given instructions and counseling regarding her condition or for health maintenance advice. Please see specific information pulled into the AVS if appropriate.     Transcribed from ambient dictation for ANGIE Tracey by Miroslava Valiente.  08/01/22   17:00 EDT    Patient verbalized consent to the visit recording.  I have personally performed the services described in this document as transcribed by the above individual, and it is both accurate and complete.  ANGIE Tracey  8/2/2022  16:41 EDT

## 2022-08-01 NOTE — PATIENT INSTRUCTIONS
Follow-up with PCP or urgent care for lab testing while you are symptomatic.  Alternate tylenol and motrin for pain and/or fever, stay hydrated and rest.   If symptoms worsen or do not improve follow up with your PCP or visit your nearest Urgent Care Center or ER.

## 2022-08-01 NOTE — PROGRESS NOTES
Subjective   Chief Complaint   Patient presents with   • Fatigue   • Swollen Glands   • Sore Throat       Christy You is a 61 y.o. female.     Patient reports fatigue, sore throat, body aches, right-sided cervical lymphadenopathy, swollen R earlobe and tender skin/scalp for 2 days.  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  She states she was initially diagnosed at age 29, she had severe symptoms with this and was hospitalized at that time.  She states since then she has a flare of the virus about twice a year and usually just treats symptoms at home, but this time symptoms seem worse.  She is certain this is what she has due to the specific symptoms she is experiencing.  She states she has not followed up with a PCP for this in years and she has not been retested during any of these flares.    URI   This is a new problem. Episode onset: 2 days. The problem has been unchanged. There has been no fever. Associated symptoms include ear pain (external), neck pain, a sore throat and swollen glands. Pertinent negatives include no chest pain, congestion, coughing, rhinorrhea, vomiting or wheezing.        Allergies   Allergen Reactions   • Other Other (See Comments)     PATCH LEFT RED WHELP UNDER PATCH AFTER LEFT ON 1 WEEK.   • Adhesive Tape Rash       Past Medical History:   Diagnosis Date   • Allergic     Year round   • Anxiety    • Arthritis     hands, knees and hips   • Benign essential hypertension    • Headache     silent migraines with the vision in one eye going white.   • Hyperthyroidism    • Hypothyroidism 2005    Hashimoto's Disease       Past Surgical History:   Procedure Laterality Date   • CATARACT EXTRACTION     • ENDOMETRIAL ABLATION  October 2001   • JOINT REPLACEMENT  June 2012   • REPLACEMENT TOTAL KNEE Left    • THYROID SURGERY     • THYROIDECTOMY         Social History     Socioeconomic History   • Marital status: Single   Tobacco Use   • Smoking status: Never Smoker   •  Smokeless tobacco: Never Used   Substance and Sexual Activity   • Alcohol use: Never     Comment: caffeine use- coffee   • Drug use: No   • Sexual activity: Not Currently     Partners: Male       Family History   Problem Relation Age of Onset   • Hypertension Mother    • Cancer Mother         Endometrial   • Hypertension Father    • Other Father         leukopenia   • Cancer Father         Leukemia   • Cancer Maternal Grandfather         Lung, life long smoker   • Coronary artery disease Paternal Grandmother    • Heart disease Paternal Grandmother    • Cancer Other         lung         Current Outpatient Medications:   •  escitalopram (LEXAPRO) 20 MG tablet, Take 0.5 tablets by mouth Daily., Disp: 90 tablet, Rfl: 1  •  levothyroxine (SYNTHROID, LEVOTHROID) 200 MCG tablet, Take 1 tablet by mouth Daily., Disp: 90 tablet, Rfl: 1  •  olmesartan-hydrochlorothiazide (BENICAR HCT) 40-25 MG per tablet, Take 1 tablet by mouth Daily., Disp: 90 tablet, Rfl: 1      Review of Systems   Constitutional: Positive for activity change and fatigue. Negative for fever.   HENT: Positive for ear pain (external), sore throat and swollen glands. Negative for congestion, rhinorrhea and voice change.    Respiratory: Negative for cough, chest tightness, shortness of breath and wheezing.    Cardiovascular: Negative for chest pain and palpitations.   Gastrointestinal: Negative for vomiting.   Musculoskeletal: Positive for myalgias, neck pain and neck stiffness.   Neurological: Negative for headache.        There were no vitals filed for this visit.    Objective   Physical Exam  Constitutional:       General: She is not in acute distress.     Appearance: Normal appearance. She is not ill-appearing, toxic-appearing or diaphoretic.   HENT:      Head: Normocephalic.      Right Ear: Swelling (per pt) present.      Ears:        Mouth/Throat:      Lips: Pink.      Mouth: Mucous membranes are moist.   Pulmonary:      Effort: Pulmonary effort is normal.    Lymphadenopathy:      Cervical: Cervical adenopathy (per pt) present.   Neurological:      Mental Status: She is alert and oriented to person, place, and time.   Psychiatric:         Mood and Affect: Mood normal.         Behavior: Behavior normal.          Procedures     Assessment & Plan   Diagnoses and all orders for this visit:    1. Viral illness (Primary)        Follow-up with PCP or urgent care for lab testing while you are symptomatic.  Alternate tylenol and motrin for pain and/or fever, stay hydrated and rest.   If symptoms worsen or do not improve follow up with your PCP or visit your nearest Urgent Care Center or ER.    PLAN: Advised pt that if she is having an Ondina-Barr virus flare, treatment would consist of symptom management, staying hydrated, resting, Tylenol/Motrin for pain.  Recommended that she follow-up with her PCP while symptomatic for lab testing to confirm Ondina-Barr virus flare.  Also advised she should have testing for other viral illnesses such as COVID and/or flu etc.  She states she is not concerned about COVID does not think this is it.  Patient states she will follow-up with PCP.  Discussed dosing, side effects, recommended other symptomatic care.  Patient should follow up with primary care provider, Urgent Care or ER if symptoms worsen, fail to resolve or other symptoms need attention. Patient/family agree to the above.         ANGIE Bonilla     The use of a video visit has been reviewed with the patient and verbal informed consent has been obtained. Myself and Christy You participated in this visit. The patient is located at [address] [place] [address]. I am located in [place], [place]. Mychart and Zoom were utilized.        This visit was performed via Telehealth.  This patient has been instructed to follow-up with their primary care provider if their symptoms worsen or the treatment provided does not resolve their illness.

## 2022-08-02 LAB
EBV VCA IGM SER IA-ACNC: <36 U/ML (ref 0–35.9)
LABCORP SARS-COV-2, NAA 2 DAY TAT: NORMAL
SARS-COV-2 RNA RESP QL NAA+PROBE: NOT DETECTED

## 2022-08-03 LAB — HSV1+2 IGM SER IA-ACNC: <0.91 RATIO (ref 0–0.9)

## 2022-09-29 ENCOUNTER — TELEPHONE (OUTPATIENT)
Dept: FAMILY MEDICINE CLINIC | Facility: CLINIC | Age: 62
End: 2022-09-29

## 2022-10-20 DIAGNOSIS — E03.9 ADULT HYPOTHYROIDISM: ICD-10-CM

## 2022-10-20 DIAGNOSIS — F41.9 ANXIETY: ICD-10-CM

## 2022-10-20 RX ORDER — ESCITALOPRAM OXALATE 20 MG/1
TABLET ORAL
Qty: 90 TABLET | Refills: 1 | Status: SHIPPED | OUTPATIENT
Start: 2022-10-20 | End: 2022-11-25 | Stop reason: ALTCHOICE

## 2022-10-20 NOTE — TELEPHONE ENCOUNTER
Rx Refill Note  Requested Prescriptions     Pending Prescriptions Disp Refills   • escitalopram (LEXAPRO) 20 MG tablet [Pharmacy Med Name: ESCITALOPRAM 20MG TABLETS] 90 tablet 1     Sig: TAKE 1 TABLET BY MOUTH DAILY      Last office visit with prescribing clinician: 8/1/2022      Next office visit with prescribing clinician: Visit date not found            Heber Hicks  10/20/22, 08:31 EDT

## 2022-10-21 RX ORDER — LEVOTHYROXINE SODIUM 0.2 MG/1
200 TABLET ORAL DAILY
Qty: 90 TABLET | Refills: 0 | Status: SHIPPED | OUTPATIENT
Start: 2022-10-21 | End: 2023-03-29 | Stop reason: SDUPTHER

## 2022-11-25 ENCOUNTER — TELEMEDICINE (OUTPATIENT)
Dept: FAMILY MEDICINE CLINIC | Facility: TELEHEALTH | Age: 62
End: 2022-11-25

## 2022-11-25 DIAGNOSIS — J06.9 ACUTE URI: Primary | ICD-10-CM

## 2022-11-25 PROCEDURE — 99213 OFFICE O/P EST LOW 20 MIN: CPT | Performed by: NURSE PRACTITIONER

## 2022-11-25 RX ORDER — BENZONATATE 200 MG/1
200 CAPSULE ORAL 3 TIMES DAILY PRN
Qty: 30 CAPSULE | Refills: 0 | Status: SHIPPED | OUTPATIENT
Start: 2022-11-25 | End: 2022-12-05

## 2022-11-25 RX ORDER — PREDNISONE 20 MG/1
20 TABLET ORAL 2 TIMES DAILY
Qty: 14 TABLET | Refills: 0 | Status: SHIPPED | OUTPATIENT
Start: 2022-11-25 | End: 2022-11-29 | Stop reason: ALTCHOICE

## 2022-11-25 NOTE — PROGRESS NOTES
Chief Complaint   Patient presents with   • Cough   • Nausea     Chest tightness, cold clammy skin       Subjective   Christy You is a 62 y.o. female.     History of Present Illness  Cough, headache, body ache, fever for 3 days. Today she woke up with feeling clammy cold and nausea no vomiting, fatigue, sinuses tender bilaterally, ear pain right ear, dizziness.   Cough  Associated symptoms include chest pain, chills, ear pain, headaches, myalgias, nasal congestion and postnasal drip. Pertinent negatives include no fever, sore throat, shortness of breath or wheezing. Her past medical history is significant for environmental allergies.   Nausea  Associated symptoms include chest pain, chills, coughing, fatigue, headaches, myalgias and nausea. Pertinent negatives include no fever or sore throat.       The following portions of the patient's history were reviewed and updated as appropriate: allergies, current medications, past medical history, and problem list.      Past Medical History:   Diagnosis Date   • Allergic     Year round   • Anxiety    • Arthritis     hands, knees and hips   • Benign essential hypertension    • Headache     silent migraines with the vision in one eye going white.   • Hyperthyroidism    • Hypothyroidism 2005    Hashimoto's Disease     Social History     Socioeconomic History   • Marital status: Single   Tobacco Use   • Smoking status: Never   • Smokeless tobacco: Never   Substance and Sexual Activity   • Alcohol use: Never     Comment: caffeine use- coffee   • Drug use: No   • Sexual activity: Not Currently     Partners: Male     medication documentation: reviewed and updated with patient and   Current Outpatient Medications:   •  benzonatate (TESSALON) 200 MG capsule, Take 1 capsule by mouth 3 (Three) Times a Day As Needed for Cough for up to 10 days., Disp: 30 capsule, Rfl: 0  •  levothyroxine (SYNTHROID, LEVOTHROID) 200 MCG tablet, Take 1 tablet by mouth Daily., Disp: 90 tablet, Rfl: 0  •   olmesartan-hydrochlorothiazide (BENICAR HCT) 40-25 MG per tablet, Take 1 tablet by mouth Daily., Disp: 90 tablet, Rfl: 1  •  predniSONE (DELTASONE) 20 MG tablet, Take 1 tablet by mouth 2 (Two) Times a Day for 7 days., Disp: 14 tablet, Rfl: 0  Review of Systems   Constitutional: Positive for chills and fatigue. Negative for fever.   HENT: Positive for ear pain and postnasal drip. Negative for sore throat.    Respiratory: Positive for cough. Negative for shortness of breath and wheezing.    Cardiovascular: Positive for chest pain.   Gastrointestinal: Positive for nausea.   Musculoskeletal: Positive for myalgias.   Allergic/Immunologic: Positive for environmental allergies.   Neurological: Positive for headaches.       Objective   Physical Exam  Constitutional:       General: She is not in acute distress.     Appearance: She is ill-appearing.   HENT:      Right Ear: No tenderness.      Left Ear: No tenderness.      Nose: Congestion present.      Right Sinus: No maxillary sinus tenderness.      Left Sinus: No maxillary sinus tenderness.   Eyes:      Conjunctiva/sclera: Conjunctivae normal.   Pulmonary:      Effort: Pulmonary effort is normal.   Neurological:      Mental Status: She is alert.   Psychiatric:         Speech: Speech normal.         Assessment & Plan   Diagnoses and all orders for this visit:    1. Acute URI (Primary)  -     benzonatate (TESSALON) 200 MG capsule; Take 1 capsule by mouth 3 (Three) Times a Day As Needed for Cough for up to 10 days.  Dispense: 30 capsule; Refill: 0  -     predniSONE (DELTASONE) 20 MG tablet; Take 1 tablet by mouth 2 (Two) Times a Day for 7 days.  Dispense: 14 tablet; Refill: 0                    Follow Up:  If your symptoms are not resolving by the completion of your treatment or are worsening, see your primary care provider for follow up. If you don't have a primary care provider, you may go to any Urgent Care for re-evaluation. If you develop any life threatening symptoms,  go to the nearest Emergency Department immediately or call EMS.               The use of  Video Visit was utilized during this visit, using both MyChart and Zoom. The use of a video visit has been reviewed with the patient and verbal informed consent has been obtained. No technical difficulties occurred during the visit.    is located at [address] [place] [address]  Provider is located at [place], [place]

## 2022-11-27 DIAGNOSIS — I10 ESSENTIAL (PRIMARY) HYPERTENSION: ICD-10-CM

## 2022-11-28 RX ORDER — OLMESARTAN MEDOXOMIL AND HYDROCHLOROTHIAZIDE 40/25 40; 25 MG/1; MG/1
1 TABLET ORAL DAILY
Qty: 90 TABLET | Refills: 1 | Status: SHIPPED | OUTPATIENT
Start: 2022-11-28 | End: 2023-03-20 | Stop reason: SDUPTHER

## 2022-11-29 ENCOUNTER — TELEMEDICINE (OUTPATIENT)
Dept: FAMILY MEDICINE CLINIC | Facility: TELEHEALTH | Age: 62
End: 2022-11-29

## 2022-11-29 DIAGNOSIS — J32.9 SINUSITIS, UNSPECIFIED CHRONICITY, UNSPECIFIED LOCATION: Primary | ICD-10-CM

## 2022-11-29 PROCEDURE — 99213 OFFICE O/P EST LOW 20 MIN: CPT | Performed by: NURSE PRACTITIONER

## 2022-11-29 RX ORDER — AMOXICILLIN AND CLAVULANATE POTASSIUM 875; 125 MG/1; MG/1
1 TABLET, FILM COATED ORAL 2 TIMES DAILY
Qty: 14 TABLET | Refills: 0 | Status: SHIPPED | OUTPATIENT
Start: 2022-11-29 | End: 2022-12-06

## 2022-11-29 NOTE — PROGRESS NOTES
Chief Complaint   Patient presents with   • Sinusitis       Subjective   Christy You is a 62 y.o. female.     History of Present Illness  Sinus congestion that has worsened with 7 days history of drainage and congestion. She says that her sinuses are infected and she needs an antibiotic. She was seen on 11/25/2022 after 3 days of Cough, headache, body ache, fever and felt to have URI, put on Prednisone. It made her feel jittery. She was also given tessalon. She still has hoarseness and symptoms have been present for several days.  Sinusitis  Associated symptoms include congestion, coughing, headaches and sinus pressure.       The following portions of the patient's history were reviewed and updated as appropriate: allergies, current medications, past medical history, and problem list.      Past Medical History:   Diagnosis Date   • Allergic     Year round   • Anxiety    • Arthritis     hands, knees and hips   • Benign essential hypertension    • Headache     silent migraines with the vision in one eye going white.   • Hyperthyroidism    • Hypothyroidism 2005    Hashimoto's Disease     Social History     Socioeconomic History   • Marital status: Single   Tobacco Use   • Smoking status: Never   • Smokeless tobacco: Never   Substance and Sexual Activity   • Alcohol use: Never     Comment: caffeine use- coffee   • Drug use: No   • Sexual activity: Not Currently     Partners: Male     medication documentation: reviewed and updated with patient and   Current Outpatient Medications:   •  amoxicillin-clavulanate (AUGMENTIN) 875-125 MG per tablet, Take 1 tablet by mouth 2 (Two) Times a Day for 7 days., Disp: 14 tablet, Rfl: 0  •  benzonatate (TESSALON) 200 MG capsule, Take 1 capsule by mouth 3 (Three) Times a Day As Needed for Cough for up to 10 days., Disp: 30 capsule, Rfl: 0  •  levothyroxine (SYNTHROID, LEVOTHROID) 200 MCG tablet, Take 1 tablet by mouth Daily., Disp: 90 tablet, Rfl: 0  •  olmesartan-hydrochlorothiazide  (BENICAR HCT) 40-25 MG per tablet, TAKE 1 TABLET BY MOUTH DAILY, Disp: 90 tablet, Rfl: 1  Review of Systems   HENT: Positive for congestion, sinus pressure, sinus pain and voice change. Negative for trouble swallowing.    Respiratory: Positive for cough.    Neurological: Positive for headaches.       Objective   Physical Exam  Constitutional:       General: She is not in acute distress.     Appearance: She is not ill-appearing.   HENT:      Nose:      Right Sinus: Maxillary sinus tenderness present.      Left Sinus: Maxillary sinus tenderness present.   Eyes:      Conjunctiva/sclera: Conjunctivae normal.   Pulmonary:      Effort: Pulmonary effort is normal.   Neurological:      Mental Status: She is alert.         Assessment & Plan   Diagnoses and all orders for this visit:    1. Sinusitis, unspecified chronicity, unspecified location (Primary)  -     amoxicillin-clavulanate (AUGMENTIN) 875-125 MG per tablet; Take 1 tablet by mouth 2 (Two) Times a Day for 7 days.  Dispense: 14 tablet; Refill: 0                    Follow Up:  If your symptoms are not resolving by the completion of your treatment or are worsening, see your primary care provider for follow up. If you don't have a primary care provider, you may go to any Urgent Care for re-evaluation. If you develop any life threatening symptoms, go to the nearest Emergency Department immediately or call EMS.               The use of  Video Visit was utilized during this visit, using both MyChart and Zoom. The use of a video visit has been reviewed with the patient and verbal informed consent has been obtained. No technical difficulties occurred during the visit.    is located at [address] [place] [id number]  Provider is located at [place], [place]

## 2023-03-20 DIAGNOSIS — I10 ESSENTIAL (PRIMARY) HYPERTENSION: ICD-10-CM

## 2023-03-20 RX ORDER — OLMESARTAN MEDOXOMIL AND HYDROCHLOROTHIAZIDE 40/25 40; 25 MG/1; MG/1
1 TABLET ORAL DAILY
Qty: 30 TABLET | Refills: 0 | Status: SHIPPED | OUTPATIENT
Start: 2023-03-20

## 2023-03-20 NOTE — TELEPHONE ENCOUNTER
Caller: Avelino Christy G    Relationship: Self    Best call back number: [phone]    Requested Prescriptions:   Requested Prescriptions     Pending Prescriptions Disp Refills   • olmesartan-hydrochlorothiazide (BENICAR HCT) 40-25 MG per tablet 90 tablet 1     Sig: Take 1 tablet by mouth Daily.        Pharmacy where request should be sent: [facility] #[id number] [facility] [address] AT [facility] [phone] [facility] [phone]      Additional details provided by patient: PATIENT IS OUT OF MEDICATION     Does the patient have less than a 3 day supply:  [x] Yes  [] No    Would you like a call back once the refill request has been completed: [] Yes [x] No    Von Reyes Rep   03/20/23 10:52 EDT

## 2023-03-29 DIAGNOSIS — E03.9 ADULT HYPOTHYROIDISM: ICD-10-CM

## 2023-03-29 NOTE — TELEPHONE ENCOUNTER
Caller: [facility] [address] [facility] [phone] [place] [phone] FX    Relationship: Pharmacy        Requested Prescriptions:   Requested Prescriptions     Pending Prescriptions Disp Refills   • levothyroxine (SYNTHROID, LEVOTHROID) 200 MCG tablet 90 tablet 0     Sig: Take 1 tablet by mouth Daily.        Pharmacy where request should be sent: [facility] [address] [facility] [phone] PH - [phone] FX     Last office visit with prescribing clinician: 8/1/2022   Last telemedicine visit with prescribing clinician: Visit date not found   Next office visit with prescribing clinician: Visit date not found     Additional details provided by patient:    Does the patient have less than a 3 day supply:  [] Yes  [x] No    Would you like a call back once the refill request has been completed: [] Yes [] No    If the office needs to give you a call back, can they leave a voicemail: [] Yes [] No    Von Jimenez Rep   03/29/23 11:19 EDT

## 2023-03-30 RX ORDER — LEVOTHYROXINE SODIUM 0.2 MG/1
200 TABLET ORAL DAILY
Qty: 30 TABLET | Refills: 0 | Status: SHIPPED | OUTPATIENT
Start: 2023-03-30

## 2023-04-11 DIAGNOSIS — I10 ESSENTIAL (PRIMARY) HYPERTENSION: ICD-10-CM

## 2023-04-11 NOTE — TELEPHONE ENCOUNTER
Caller: Christy You    Relationship: Self    Best call back number: [phone]*    Requested Prescriptions:   Requested Prescriptions     Pending Prescriptions Disp Refills   • olmesartan-hydrochlorothiazide (BENICAR HCT) 40-25 MG per tablet 30 tablet 0     Sig: Take 1 tablet by mouth Daily.        Pharmacy where request should be sent: [facility] #[id number] [facility] [address] AT [facility] [place] [phone] [facility] [phone]      Last office visit with prescribing clinician: 8/1/2022   Last telemedicine visit with prescribing clinician: 5/1/2023   Next office visit with prescribing clinician: 5/1/2023     Additional details provided by patient: PATIENT REQUEST ENOUGH MEDICATION REFILL TO GET HER TO HER APPOINTMENT SCHEDULED FOR 5/1/23.  PATIENT STATES SHE HAS 5-6 DAYS OF MEDICATION REMAINING.    Does the patient have less than a 3 day supply:  [] Yes  [x] No    Would you like a call back once the refill request has been completed: [x] Yes [] No    If the office needs to give you a call back, can they leave a voicemail: [x] Yes [] No    Gareth Alvarez   04/11/23 11:17 EDT

## 2023-04-12 RX ORDER — OLMESARTAN MEDOXOMIL AND HYDROCHLOROTHIAZIDE 40/25 40; 25 MG/1; MG/1
1 TABLET ORAL DAILY
Qty: 30 TABLET | Refills: 0 | Status: SHIPPED | OUTPATIENT
Start: 2023-04-12

## 2023-05-01 ENCOUNTER — OFFICE VISIT (OUTPATIENT)
Dept: FAMILY MEDICINE CLINIC | Facility: CLINIC | Age: 63
End: 2023-05-01
Payer: COMMERCIAL

## 2023-05-01 VITALS
WEIGHT: 231 LBS | OXYGEN SATURATION: 98 % | SYSTOLIC BLOOD PRESSURE: 136 MMHG | HEIGHT: 66 IN | TEMPERATURE: 95.5 F | DIASTOLIC BLOOD PRESSURE: 80 MMHG | HEART RATE: 64 BPM | BODY MASS INDEX: 37.12 KG/M2

## 2023-05-01 DIAGNOSIS — E03.9 ADULT HYPOTHYROIDISM: Chronic | ICD-10-CM

## 2023-05-01 DIAGNOSIS — I10 ESSENTIAL (PRIMARY) HYPERTENSION: Primary | Chronic | ICD-10-CM

## 2023-05-01 PROCEDURE — 99214 OFFICE O/P EST MOD 30 MIN: CPT | Performed by: NURSE PRACTITIONER

## 2023-05-01 RX ORDER — OLMESARTAN MEDOXOMIL AND HYDROCHLOROTHIAZIDE 40/25 40; 25 MG/1; MG/1
1 TABLET ORAL DAILY
Qty: 90 TABLET | Refills: 1 | Status: SHIPPED | OUTPATIENT
Start: 2023-05-01

## 2023-05-01 NOTE — PROGRESS NOTES
Patient with no complaints. Denies vaginal bleeding or contractions. Good FM.   BPP 8/8 today; continue bi-weekly testing    DM2 on insulin  Patient reports drinking soft drinks and having a snoball yesterday with a spike in her glucose.  BG right now 128  Insulin: Toujeo 30 units in am and Novolog 26 units before meals.  Will need to adjust insulin, but has appointment with endocrinology tomorrow. Discussed soft drinks and snoballs etc will increase her sugar even if she is otherwise following diet with food.  - Polyhydramnios stable  - Will plan for delivery at 37 WGA secondary to noncompliant DM2 and comorbidities    She has h/o DVT with pregnancy. IVC filter in place and also on Lovenox. Discussed holding Lovenox starting next week in anticipation of neuraxial anesthesia and will start again in pp period.     Vitals signs, FHTs, urine dip, and PE findings documented, reviewed and available in OB flow chart.       I spent a total of 20 minutes on the day of the visit.This includes face to face time and non-face to face time preparing to see the patient (eg, review of tests), Obtaining and/or reviewing separately obtained history, Documenting clinical information in the electronic or other health record, Independently interpreting resultsand communicating results to the patient/family/caregiver, or Care coordination.      "Chief Complaint  Hypertension (F/u htn )    Subjective        Christy You presents to [facility]  History of Present Illness patient is here for follow-up on hypertension and hypothyroidism.  Her BP has been running about what it is here today even through the entire stressful tax season.  She does notice sometimes she gets some very mild intermittent dizziness which typically occurs when she is sitting at her desk.  Resolves quickly without intervention.  Does not check her blood pressure at the time.  She does admit she has been trying not to eat candy at her desk as much and not sure if it could be related to dependent blood sugars.  Denies orthostasis, headaches, chest pain, palpitations    Hypothyroidism: She has been taking tolerating her current dose of levothyroxine without signs or symptoms of hypo or hyperthyroid.          Objective   Vital Signs:  /80   Pulse 64   Temp 95.5 °F (35.3 °C)   Ht 167.6 cm (66\")   Wt 105 kg (231 lb)   SpO2 98%   BMI 37.28 kg/m²   Estimated body mass index is 37.28 kg/m² as calculated from the following:    Height as of this encounter: 167.6 cm (66\").    Weight as of this encounter: 105 kg (231 lb).       Class 2 Severe Obesity (BMI >=35 and <=39.9). Obesity-related health conditions include the following: hypertension. Obesity is unchanged. BMI is is above average; BMI management plan is completed. We discussed portion control and increasing exercise.      Physical Exam  Vitals and nursing note reviewed.   Constitutional:       General: She is not in acute distress.     Appearance: She is well-developed. She is not ill-appearing or diaphoretic.   HENT:      Head: Normocephalic and atraumatic.   Eyes:      General:         Right eye: No discharge.         Left eye: No discharge.      Conjunctiva/sclera: Conjunctivae normal.   Cardiovascular:      Rate and Rhythm: Normal rate and regular rhythm.      Heart sounds: Normal heart sounds. "   Pulmonary:      Effort: Pulmonary effort is normal.      Breath sounds: Normal breath sounds.   Abdominal:      General: Bowel sounds are normal.      Palpations: Abdomen is soft.      Tenderness: There is no abdominal tenderness.   Musculoskeletal:         General: No deformity.      Comments: Gait smooth and steady   Skin:     General: Skin is warm and dry.   Neurological:      General: No focal deficit present.      Mental Status: She is alert and oriented to person, place, and time.   Psychiatric:         Mood and Affect: Mood normal.         Behavior: Behavior normal.        Result Review :                   Assessment and Plan   Diagnoses and all orders for this visit:    1. Essential (primary) hypertension (Primary)  -     Comprehensive Metabolic Panel  -     CBC & Differential  -     Microalbumin / Creatinine Urine Ratio - Urine, Clean Catch  -     olmesartan-hydrochlorothiazide (BENICAR HCT) 40-25 MG per tablet; Take 1 tablet by mouth Daily.  Dispense: 90 tablet; Refill: 1    2. Adult hypothyroidism  -     TSH    Hypertension: Blood pressure has been at goal on current dose of olmesartan HCTZ which we will continue.  Will need labs and microalbumin today.  Olmesartan HCTZ has been refilled.    Hypothyroidism: We will continue current levothyroxine and check a TSH.  We will adjust based on lab values.  No refills needed today.         Follow Up   Return in about 6 months (around 11/1/2023) for Annual physical.  Patient was given instructions and counseling regarding her condition or for health maintenance advice. Please see specific information pulled into the AVS if appropriate.       Answers for HPI/ROS submitted by the patient on 4/24/2023  What is the primary reason for your visit?: High Blood Pressure

## 2023-05-02 LAB
ALBUMIN SERPL-MCNC: 4.4 G/DL (ref 3.5–5.2)
ALBUMIN/CREAT UR: 5 MG/G CREAT (ref 0–29)
ALBUMIN/GLOB SERPL: 1.6 G/DL
ALP SERPL-CCNC: 86 U/L (ref 39–117)
ALT SERPL-CCNC: 33 U/L (ref 1–33)
AST SERPL-CCNC: 23 U/L (ref 1–32)
BASOPHILS # BLD AUTO: 0.05 10*3/MM3 (ref 0–0.2)
BASOPHILS NFR BLD AUTO: 0.7 % (ref 0–1.5)
BILIRUB SERPL-MCNC: 0.3 MG/DL (ref 0–1.2)
BUN SERPL-MCNC: 19 MG/DL (ref 8–23)
BUN/CREAT SERPL: 17.3 (ref 7–25)
CALCIUM SERPL-MCNC: 10.3 MG/DL (ref 8.6–10.5)
CHLORIDE SERPL-SCNC: 100 MMOL/L (ref 98–107)
CO2 SERPL-SCNC: 31.1 MMOL/L (ref 22–29)
CREAT SERPL-MCNC: 1.1 MG/DL (ref 0.57–1)
CREAT UR-MCNC: 84.8 MG/DL
EGFRCR SERPLBLD CKD-EPI 2021: 56.2 ML/MIN/1.73
EOSINOPHIL # BLD AUTO: 0.19 10*3/MM3 (ref 0–0.4)
EOSINOPHIL NFR BLD AUTO: 2.6 % (ref 0.3–6.2)
ERYTHROCYTE [DISTWIDTH] IN BLOOD BY AUTOMATED COUNT: 12.3 % (ref 12.3–15.4)
GLOBULIN SER CALC-MCNC: 2.7 GM/DL
GLUCOSE SERPL-MCNC: 100 MG/DL (ref 65–99)
HCT VFR BLD AUTO: 44.2 % (ref 34–46.6)
HGB BLD-MCNC: 15 G/DL (ref 12–15.9)
IMM GRANULOCYTES # BLD AUTO: 0.02 10*3/MM3 (ref 0–0.05)
IMM GRANULOCYTES NFR BLD AUTO: 0.3 % (ref 0–0.5)
LYMPHOCYTES # BLD AUTO: 2.6 10*3/MM3 (ref 0.7–3.1)
LYMPHOCYTES NFR BLD AUTO: 36.3 % (ref 19.6–45.3)
MCH RBC QN AUTO: 31 PG (ref 26.6–33)
MCHC RBC AUTO-ENTMCNC: 33.9 G/DL (ref 31.5–35.7)
MCV RBC AUTO: 91.3 FL (ref 79–97)
MICROALBUMIN UR-MCNC: 3.9 UG/ML
MONOCYTES # BLD AUTO: 0.5 10*3/MM3 (ref 0.1–0.9)
MONOCYTES NFR BLD AUTO: 7 % (ref 5–12)
NEUTROPHILS # BLD AUTO: 3.81 10*3/MM3 (ref 1.7–7)
NEUTROPHILS NFR BLD AUTO: 53.1 % (ref 42.7–76)
NRBC BLD AUTO-RTO: 0 /100 WBC (ref 0–0.2)
PLATELET # BLD AUTO: 207 10*3/MM3 (ref 140–450)
POTASSIUM SERPL-SCNC: 3.7 MMOL/L (ref 3.5–5.2)
PROT SERPL-MCNC: 7.1 G/DL (ref 6–8.5)
RBC # BLD AUTO: 4.84 10*6/MM3 (ref 3.77–5.28)
SODIUM SERPL-SCNC: 140 MMOL/L (ref 136–145)
TSH SERPL DL<=0.005 MIU/L-ACNC: 0.8 UIU/ML (ref 0.27–4.2)
WBC # BLD AUTO: 7.17 10*3/MM3 (ref 3.4–10.8)

## 2023-05-04 DIAGNOSIS — R79.89 ELEVATED SERUM CREATININE: Primary | ICD-10-CM

## 2023-05-22 ENCOUNTER — HOSPITAL ENCOUNTER (OUTPATIENT)
Dept: CARDIOLOGY | Facility: HOSPITAL | Age: 63
Discharge: HOME | End: 2023-05-22
Admitting: INTERNAL MEDICINE
Payer: COMMERCIAL

## 2023-05-22 ENCOUNTER — OFFICE VISIT (OUTPATIENT)
Dept: FAMILY MEDICINE CLINIC | Facility: CLINIC | Age: 63
End: 2023-05-22
Payer: COMMERCIAL

## 2023-05-22 VITALS
SYSTOLIC BLOOD PRESSURE: 133 MMHG | BODY MASS INDEX: 35.03 KG/M2 | DIASTOLIC BLOOD PRESSURE: 83 MMHG | WEIGHT: 218 LBS | HEIGHT: 66 IN | OXYGEN SATURATION: 100 % | HEART RATE: 71 BPM

## 2023-05-22 VITALS
BODY MASS INDEX: 35.04 KG/M2 | DIASTOLIC BLOOD PRESSURE: 83 MMHG | WEIGHT: 218.03 LBS | HEART RATE: 71 BPM | SYSTOLIC BLOOD PRESSURE: 133 MMHG | HEIGHT: 66 IN

## 2023-05-22 VITALS
WEIGHT: 218.8 LBS | TEMPERATURE: 96.4 F | OXYGEN SATURATION: 100 % | SYSTOLIC BLOOD PRESSURE: 136 MMHG | HEART RATE: 67 BPM | HEIGHT: 66 IN | DIASTOLIC BLOOD PRESSURE: 71 MMHG | BODY MASS INDEX: 35.17 KG/M2

## 2023-05-22 DIAGNOSIS — R55 NEAR SYNCOPE: ICD-10-CM

## 2023-05-22 DIAGNOSIS — R07.2 PRECORDIAL PAIN: ICD-10-CM

## 2023-05-22 DIAGNOSIS — R06.02 SHORTNESS OF BREATH: ICD-10-CM

## 2023-05-22 DIAGNOSIS — R07.9 EXERTIONAL CHEST PAIN: Primary | ICD-10-CM

## 2023-05-22 DIAGNOSIS — R55 NEAR SYNCOPE: Primary | ICD-10-CM

## 2023-05-22 LAB
ALBUMIN SERPL-MCNC: 4.4 G/DL (ref 3.5–5.2)
ALBUMIN/GLOB SERPL: 1.6 G/DL
ALP SERPL-CCNC: 85 U/L (ref 39–117)
ALT SERPL W P-5'-P-CCNC: 23 U/L (ref 1–33)
ANION GAP SERPL CALCULATED.3IONS-SCNC: 12 MMOL/L (ref 5–15)
ASCENDING AORTA: 3.5 CM
AST SERPL-CCNC: 19 U/L (ref 1–32)
BH CV ECHO MEAS - ACS: 1.1 CM
BH CV ECHO MEAS - AO MAX PG: 8.3 MMHG
BH CV ECHO MEAS - AO MEAN PG: 4.5 MMHG
BH CV ECHO MEAS - AO ROOT DIAM: 3.5 CM
BH CV ECHO MEAS - AO V2 MAX: 144.5 CM/SEC
BH CV ECHO MEAS - AO V2 VTI: 31.7 CM
BH CV ECHO MEAS - AVA(I,D): 2.8 CM2
BH CV ECHO MEAS - EDV(CUBED): 74.8 ML
BH CV ECHO MEAS - EDV(MOD-SP2): 118 ML
BH CV ECHO MEAS - EDV(MOD-SP4): 73 ML
BH CV ECHO MEAS - EF(MOD-BP): 63 %
BH CV ECHO MEAS - EF(MOD-SP2): 65.3 %
BH CV ECHO MEAS - EF(MOD-SP4): 58.9 %
BH CV ECHO MEAS - ESV(CUBED): 29.6 ML
BH CV ECHO MEAS - ESV(MOD-SP2): 41 ML
BH CV ECHO MEAS - ESV(MOD-SP4): 30 ML
BH CV ECHO MEAS - FS: 26.6 %
BH CV ECHO MEAS - IVS/LVPW: 1.03 CM
BH CV ECHO MEAS - IVSD: 1.09 CM
BH CV ECHO MEAS - LAT PEAK E' VEL: 5.8 CM/SEC
BH CV ECHO MEAS - LV DIASTOLIC VOL/BSA (35-75): 35.1 CM2
BH CV ECHO MEAS - LV MASS(C)D: 153.4 GRAMS
BH CV ECHO MEAS - LV MAX PG: 3.6 MMHG
BH CV ECHO MEAS - LV MEAN PG: 2.13 MMHG
BH CV ECHO MEAS - LV SYSTOLIC VOL/BSA (12-30): 14.4 CM2
BH CV ECHO MEAS - LV V1 MAX: 94.3 CM/SEC
BH CV ECHO MEAS - LV V1 VTI: 24.2 CM
BH CV ECHO MEAS - LVIDD: 4.2 CM
BH CV ECHO MEAS - LVIDS: 3.1 CM
BH CV ECHO MEAS - LVOT AREA: 3.7 CM2
BH CV ECHO MEAS - LVOT DIAM: 2.17 CM
BH CV ECHO MEAS - LVPWD: 1.06 CM
BH CV ECHO MEAS - MED PEAK E' VEL: 5.9 CM/SEC
BH CV ECHO MEAS - MV A DUR: 0.15 SEC
BH CV ECHO MEAS - MV A MAX VEL: 60 CM/SEC
BH CV ECHO MEAS - MV DEC SLOPE: 246.2 CM/SEC2
BH CV ECHO MEAS - MV DEC TIME: 0.31 MSEC
BH CV ECHO MEAS - MV E MAX VEL: 45.8 CM/SEC
BH CV ECHO MEAS - MV E/A: 0.76
BH CV ECHO MEAS - MV MAX PG: 2.8 MMHG
BH CV ECHO MEAS - MV MEAN PG: 1.04 MMHG
BH CV ECHO MEAS - MV P1/2T: 89.7 MSEC
BH CV ECHO MEAS - MV V2 VTI: 25.5 CM
BH CV ECHO MEAS - MVA(P1/2T): 2.45 CM2
BH CV ECHO MEAS - MVA(VTI): 3.5 CM2
BH CV ECHO MEAS - PA ACC TIME: 0.09 SEC
BH CV ECHO MEAS - PA PR(ACCEL): 39.4 MMHG
BH CV ECHO MEAS - PA V2 MAX: 83.9 CM/SEC
BH CV ECHO MEAS - PULM A REVS DUR: 0.13 SEC
BH CV ECHO MEAS - PULM A REVS VEL: 42.8 CM/SEC
BH CV ECHO MEAS - PULM DIAS VEL: 40.2 CM/SEC
BH CV ECHO MEAS - PULM S/D: 1.35
BH CV ECHO MEAS - PULM SYS VEL: 54.2 CM/SEC
BH CV ECHO MEAS - QP/QS: 0.46
BH CV ECHO MEAS - RV MAX PG: 1.9 MMHG
BH CV ECHO MEAS - RV V1 MAX: 69 CM/SEC
BH CV ECHO MEAS - RV V1 VTI: 15 CM
BH CV ECHO MEAS - RVOT DIAM: 1.86 CM
BH CV ECHO MEAS - SI(MOD-SP2): 37.1 ML/M2
BH CV ECHO MEAS - SI(MOD-SP4): 20.7 ML/M2
BH CV ECHO MEAS - SV(LVOT): 89.4 ML
BH CV ECHO MEAS - SV(MOD-SP2): 77 ML
BH CV ECHO MEAS - SV(MOD-SP4): 43 ML
BH CV ECHO MEAS - SV(RVOT): 41.1 ML
BH CV ECHO MEAS - TAPSE (>1.6): 2.27 CM
BH CV ECHO MEASUREMENTS AVERAGE E/E' RATIO: 7.83
BH CV XLRA - RV BASE: 3.3 CM
BH CV XLRA - RV LENGTH: 6.7 CM
BH CV XLRA - RV MID: 3.2 CM
BH CV XLRA - TDI S': 9.5 CM/SEC
BILIRUB SERPL-MCNC: 0.7 MG/DL (ref 0–1.2)
BUN SERPL-MCNC: 26 MG/DL (ref 8–23)
BUN/CREAT SERPL: 30.6 (ref 7–25)
CALCIUM SPEC-SCNC: 9.6 MG/DL (ref 8.6–10.5)
CHLORIDE SERPL-SCNC: 99 MMOL/L (ref 98–107)
CO2 SERPL-SCNC: 27 MMOL/L (ref 22–29)
CREAT SERPL-MCNC: 0.85 MG/DL (ref 0.57–1)
D DIMER PPP FEU-MCNC: 0.41 MCGFEU/ML (ref 0–0.64)
EGFRCR SERPLBLD CKD-EPI 2021: 76.6 ML/MIN/1.73
GLOBULIN UR ELPH-MCNC: 2.7 GM/DL
GLUCOSE SERPL-MCNC: 94 MG/DL (ref 65–99)
LEFT ATRIUM VOLUME INDEX: 19.5 ML/M2
MAXIMAL PREDICTED HEART RATE: 156 BPM
NT-PROBNP SERPL-MCNC: 34.6 PG/ML (ref 0–900)
POTASSIUM SERPL-SCNC: 3.7 MMOL/L (ref 3.5–5.2)
PROT SERPL-MCNC: 7.1 G/DL (ref 6–8.5)
SINUS: 3.8 CM
SODIUM SERPL-SCNC: 138 MMOL/L (ref 136–145)
STJ: 3 CM
STRESS TARGET HR: 133 BPM
TROPONIN T SERPL HS-MCNC: 9 NG/L

## 2023-05-22 PROCEDURE — 94760 N-INVAS EAR/PLS OXIMETRY 1: CPT

## 2023-05-22 PROCEDURE — 85379 FIBRIN DEGRADATION QUANT: CPT | Performed by: INTERNAL MEDICINE

## 2023-05-22 PROCEDURE — 83880 ASSAY OF NATRIURETIC PEPTIDE: CPT | Performed by: INTERNAL MEDICINE

## 2023-05-22 PROCEDURE — 93306 TTE W/DOPPLER COMPLETE: CPT | Performed by: INTERNAL MEDICINE

## 2023-05-22 PROCEDURE — 36415 COLL VENOUS BLD VENIPUNCTURE: CPT

## 2023-05-22 PROCEDURE — 84484 ASSAY OF TROPONIN QUANT: CPT | Performed by: INTERNAL MEDICINE

## 2023-05-22 PROCEDURE — 93306 TTE W/DOPPLER COMPLETE: CPT

## 2023-05-22 PROCEDURE — 99213 OFFICE O/P EST LOW 20 MIN: CPT | Performed by: NURSE PRACTITIONER

## 2023-05-22 PROCEDURE — 80053 COMPREHEN METABOLIC PANEL: CPT

## 2023-05-22 PROCEDURE — 25510000001 PERFLUTREN (DEFINITY) 8.476 MG IN SODIUM CHLORIDE (PF) 0.9 % 10 ML INJECTION: Performed by: INTERNAL MEDICINE

## 2023-05-22 PROCEDURE — 93000 ELECTROCARDIOGRAM COMPLETE: CPT | Performed by: NURSE PRACTITIONER

## 2023-05-22 RX ORDER — SODIUM CHLORIDE 0.9 % (FLUSH) 0.9 %
10 SYRINGE (ML) INJECTION AS NEEDED
Status: SHIPPED | OUTPATIENT
Start: 2023-05-22

## 2023-05-22 RX ORDER — ASPIRIN 81 MG/1
324 TABLET, CHEWABLE ORAL ONCE
Status: SHIPPED | OUTPATIENT
Start: 2023-05-22

## 2023-05-22 RX ORDER — NITROGLYCERIN 0.4 MG/1
0.4 TABLET SUBLINGUAL
Status: SHIPPED | OUTPATIENT
Start: 2023-05-22

## 2023-05-22 RX ADMIN — PERFLUTREN 2 ML: 6.52 INJECTION, SUSPENSION INTRAVENOUS at 17:25

## 2023-05-22 NOTE — PROGRESS NOTES
[facility]      Patient Name: Christy You  :1960  Age: 62 y.o.  Encounter Provider:  VINAYAK MONDRAGON      Chief Complaint:   Chief Complaint   Patient presents with   • Chest Pain         HPI  Christy You is a 62 y.o. female past medical history of hypertension presents for evaluation of chest pain and near syncope.  Patient has been noticing chest tightness when walking the dog over the last month.  She feels this is worsening and when having the pain she notes that it is worsened by deep breath.  She also notes that these episodes are worse when the weather gets warmer.  The other day while driving home she had an episode of visual deficit and was able to make it home without having any issues.  She came into the house and was able to grab a glass of water and things resolved after about 10 minutes.  She has had similar issues with visual deficits which are usually unilateral and represent her migraine aura however she has never had any issues with bilateral field deficits like she did with this episode.  She has a strong family history of coronary artery disease with second-degree relatives requiring surgical bypass and her grandmother suffering a fatal MI at a fairly young age.  Social history was reviewed and is not pertinent to this clinic visit.  EKG performed at PCP visit showed sinus rhythm with nonspecific repolarization abnormalities.      The following portions of the patient's history were reviewed and updated as appropriate: allergies, current medications, past family history, past medical history, past social history, past surgical history and problem list.      Review of Systems   Constitutional: Negative for chills and fever.   HENT: Negative for hoarse voice and sore throat.    Eyes: Negative for double vision and photophobia.   Cardiovascular: Positive for chest pain and near-syncope. Negative for leg swelling, orthopnea, palpitations, paroxysmal nocturnal dyspnea and  "syncope.   Respiratory: Negative for cough and wheezing.    Skin: Negative for poor wound healing and rash.   Musculoskeletal: Negative for arthritis and joint swelling.   Gastrointestinal: Negative for bloating, abdominal pain, hematemesis and hematochezia.   Neurological: Negative for dizziness and focal weakness.   Psychiatric/Behavioral: Negative for depression and suicidal ideas.       OBJECTIVE:   Vital Signs  Vitals:    05/22/23 1324   BP: 133/83   Pulse: 71   SpO2: 100%     Estimated body mass index is 35.2 kg/m² as calculated from the following:    Height as of this encounter: 167.6 cm (65.98\").    Weight as of this encounter: 98.9 kg (218 lb).    Vitals reviewed.   Constitutional:       Appearance: Healthy appearance. Not in distress.   Neck:      Vascular: No JVR. JVD normal.   Pulmonary:      Effort: Pulmonary effort is normal.      Breath sounds: Normal breath sounds. No wheezing. No rhonchi. No rales.   Chest:      Chest wall: Not tender to palpatation.   Cardiovascular:      PMI at left midclavicular line. Normal rate. Regular rhythm. Normal S1. Normal S2.      Murmurs: There is no murmur.      No gallop. No click. No rub.   Pulses:     Intact distal pulses.   Edema:     Peripheral edema absent.   Abdominal:      General: Bowel sounds are normal.      Palpations: Abdomen is soft.      Tenderness: There is no abdominal tenderness.   Musculoskeletal: Normal range of motion.         General: No tenderness. Skin:     General: Skin is warm and dry.   Neurological:      General: No focal deficit present.      Mental Status: Alert and oriented to person, place and time.         Procedures         BUN   Date Value Ref Range Status   05/22/2023 26 (H) 8 - 23 mg/dL Final     Creatinine   Date Value Ref Range Status   05/22/2023 0.85 0.57 - 1.00 mg/dL Final     Potassium   Date Value Ref Range Status   05/22/2023 3.7 3.5 - 5.2 mmol/L Final     ALT (SGPT)   Date Value Ref Range Status   05/22/2023 23 1 - 33 U/L " Final     AST (SGOT)   Date Value Ref Range Status   05/22/2023 19 1 - 32 U/L Final           ASSESSMENT:      Diagnosis Plan   1. Precordial pain  Cardiac Monitoring    Vital Signs - Once    Vital Signs - As Needed    Pulse Oximetry    Oxygen Therapy- Nasal Cannula; Titrate 1-6 LPM Per SpO2; 92% or Greater    Insert Peripheral IV    sodium chloride 0.9 % flush 10 mL    nitroglycerin (NITROSTAT) SL tablet 0.4 mg    NPO Diet NPO Type: Strict NPO    Bathroom Privileges With Assistance    CBC & Differential    Comprehensive Metabolic Panel    Troponin    D-Dimer    proBNP    Cardiac Monitoring    Vital Signs - Once    Insert Peripheral IV    NPO Diet NPO Type: Strict NPO    Bathroom Privileges With Assistance    Troponin    Troponin    D-Dimer    D-Dimer    proBNP    proBNP    High Sensitivity Troponin T 2Hr    High Sensitivity Troponin T 2Hr    Adult Transthoracic Echo Complete W/ Cont if Necessary Per Protocol      2. Shortness of breath  Cardiac Monitoring    Vital Signs - Once    Vital Signs - As Needed    Pulse Oximetry    Oxygen Therapy- Nasal Cannula; Titrate 1-6 LPM Per SpO2; 92% or Greater    Insert Peripheral IV    sodium chloride 0.9 % flush 10 mL    nitroglycerin (NITROSTAT) SL tablet 0.4 mg    NPO Diet NPO Type: Strict NPO    Bathroom Privileges With Assistance    CBC & Differential    Comprehensive Metabolic Panel    Troponin    D-Dimer    proBNP    Cardiac Monitoring    Vital Signs - Once    Insert Peripheral IV    NPO Diet NPO Type: Strict NPO    Bathroom Privileges With Assistance    Troponin    Troponin    D-Dimer    D-Dimer    proBNP    proBNP    High Sensitivity Troponin T 2Hr    High Sensitivity Troponin T 2Hr            PLAN OF CARE:     1. Exertional chest pain -concerning for anginal equivalent.  Patient cannot perform at least 5 METS.  She already had caffeine today.  We will plan for nuclear stress study in the morning as her labs were unremarkable and her EKG did not show acute ischemia.   Echocardiogram today showed normal EF with no significant valvular heart disease  2. Near syncope -etiology uncertain.  Normal echocardiogram today.  We will plan for Holter monitor after stress study tomorrow  3. Hypertension -fairly well controlled today in clinic    Return to clinic 6-month             Discharge Medications      ASK your doctor about these medications      Instructions Start Date   levothyroxine 200 MCG tablet  Commonly known as: SYNTHROID, LEVOTHROID   200 mcg, Oral, Daily      olmesartan-hydrochlorothiazide 40-25 MG per tablet  Commonly known as: BENICAR HCT   1 tablet, Oral, Daily             Thank you for allowing me to participate in the care of your patient,      Sincerely,   Silvano Franco Jr, MD  [facility]  05/22/23  17:17 EDT

## 2023-05-22 NOTE — PATIENT INSTRUCTIONS
To chest pain clinic now  Recommend having a family member drive you  On the way should you have chest pain increased shortness of breath nausea vomiting call 911 and stop    Follow-up Jaelyn any recurrent pelvic pain or abdominal pain

## 2023-05-22 NOTE — PROGRESS NOTES
"Chief Complaint  Chest Pain (Chest tightness x 1 month ), Eye Problem (Vision \"white\"out. 5/21/23), and Groin Pain (Right sided groin pain )    Subjective        Christy You presents to [facility]  History of Present Illness  Patient complains of exertional chest pain when walking the dog etc. without increased shortness of breath nausea vomiting radiating pain this  For about 3 weeks which is new  Yesterday she was driving her dad, and vision was decreased in both eyes and she felt weak like she was going to pass out.  She had no chest pain and no severe headache confusion slurred speech or weakness to suggest TIA or stroke she has no recurrent syncope    She does have hypertension controlled next hyperlipidemia not taking a statin her PCP is ANGIE Long  She is presently without chest pain no contraindications to aspirin      Chest Pain     Eye Problem     Groin Pain        Objective   Vital Signs:  /71   Pulse 67   Temp 96.4 °F (35.8 °C) (Temporal)   Ht 167.6 cm (66\")   Wt 99.2 kg (218 lb 12.8 oz)   SpO2 100%   BMI 35.32 kg/m²   Estimated body mass index is 35.32 kg/m² as calculated from the following:    Height as of this encounter: 167.6 cm (66\").    Weight as of this encounter: 99.2 kg (218 lb 12.8 oz).    Class 2 Severe Obesity (BMI >=35 and <=39.9). Obesity-related health conditions include the following: .. Obesity is unchanged. BMI is is above average; BMI management plan is completed. We discussed portion control and increasing exercise.      Physical Exam  Vitals reviewed.   Constitutional:       General: She is not in acute distress.     Appearance: Normal appearance. She is well-developed. She is not ill-appearing, toxic-appearing or diaphoretic.      Comments: Pleasant appears well no distress   HENT:      Head: Normocephalic.      Nose: Nose normal.   Eyes:      General: No scleral icterus.     Conjunctiva/sclera: Conjunctivae normal.      Pupils: " Pupils are equal, round, and reactive to light.   Neck:      Thyroid: No thyromegaly.      Vascular: No JVD.   Cardiovascular:      Rate and Rhythm: Normal rate and regular rhythm.      Heart sounds: Normal heart sounds. No murmur heard.    No friction rub. No gallop.   Pulmonary:      Effort: Pulmonary effort is normal. No respiratory distress.      Breath sounds: Normal breath sounds. No stridor. No wheezing or rales.   Abdominal:      General: Abdomen is flat. Bowel sounds are normal. There is no distension.      Palpations: Abdomen is soft. There is no mass.      Tenderness: There is no abdominal tenderness. There is no guarding or rebound.      Hernia: No hernia is present.      Comments: No hepatosplenomegaly, no ascites,   Musculoskeletal:         General: No tenderness.      Cervical back: Neck supple.   Lymphadenopathy:      Cervical: No cervical adenopathy.   Skin:     General: Skin is warm and dry.      Findings: No erythema or rash.   Neurological:      General: No focal deficit present.      Mental Status: She is alert and oriented to person, place, and time. Mental status is at baseline.      Cranial Nerves: No cranial nerve deficit.      Sensory: No sensory deficit.      Motor: No weakness.      Coordination: Coordination normal.      Gait: Gait normal.      Deep Tendon Reflexes: Reflexes are normal and symmetric. Reflexes normal.   Psychiatric:         Mood and Affect: Mood normal.         Behavior: Behavior normal.         Thought Content: Thought content normal.         Judgment: Judgment normal.        Result Review :                Assessment and Plan   Diagnoses and all orders for this visit:    1. Exertional chest pain (Primary)  -     ECG 12 Lead    2. Near syncope  -     ECG 12 Lead           I spent 20 minutes caring for Christy on this date of service. This time includes time spent by me in the following activities:preparing for the visit, reviewing tests, obtaining and/or reviewing a  separately obtained history, performing a medically appropriate examination and/or evaluation , counseling and educating the patient/family/caregiver, ordering medications, tests, or procedures, referring and communicating with other health care professionals , documenting information in the medical record and care coordination  Follow Up   No follow-ups on file.  Patient was given instructions and counseling regarding her condition or for health maintenance advice. Please see specific information pulled into the AVS if appropriate.     Patient Instructions   To chest pain clinic now  Recommend having a family member drive you  On the way should you have chest pain increased shortness of breath nausea vomiting call 911 and stop    Follow-up Jaelyn any recurrent pelvic pain or abdominal pain         Answers for HPI/ROS submitted by the patient on 5/22/2023  Please describe your symptoms.: Chest tightness, lightheaded.  Have you had these symptoms before?: Yes  How long have you been having these symptoms?: 1-2 weeks  Please list any medications you are currently taking for this condition.: None  Please describe any probable cause for these symptoms. : Possible heart condition.  What is the primary reason for your visit?: Other

## 2023-05-22 NOTE — LETTER
May 22, 2023       No Recipients    Patient: Christy You   YOB: 1960   Date of Visit: 2023       Dear Dr. Gil Recipients:    Thank you for referring Christy You to me for evaluation. Below are the relevant portions of my assessment and plan of care.    If you have questions, please do not hesitate to call me. I look forward to following Christy along with you.         Sincerely,        [facility] [place] [facility]        CC:   No Recipients    Silvano Franco Jr., MD  [phone]  Signed          [facility]      Patient Name: Christy You  :1960  Age: 62 y.o.  Encounter Provider:  GERALDO CPU      Chief Complaint:   Chief Complaint   Patient presents with   • Chest Pain         HPI  Christy You is a 62 y.o. female past medical history of hypertension presents for evaluation of chest pain and near syncope.  Patient has been noticing chest tightness when walking the dog over the last month.  She feels this is worsening and when having the pain she notes that it is worsened by deep breath.  She also notes that these episodes are worse when the weather gets warmer.  The other day while driving home she had an episode of visual deficit and was able to make it home without having any issues.  She came into the house and was able to grab a glass of water and things resolved after about 10 minutes.  She has had similar issues with visual deficits which are usually unilateral and represent her migraine aura however she has never had any issues with bilateral field deficits like she did with this episode.  She has a strong family history of coronary artery disease with second-degree relatives requiring surgical bypass and her grandmother suffering a fatal MI at a fairly young age.  Social history was reviewed and is not pertinent to this clinic visit.  EKG performed at PCP visit showed sinus rhythm with nonspecific repolarization abnormalities.      The following portions of the patient's history were  "reviewed and updated as appropriate: allergies, current medications, past family history, past medical history, past social history, past surgical history and problem list.      Review of Systems   Constitutional: Negative for chills and fever.   HENT: Negative for hoarse voice and sore throat.    Eyes: Negative for double vision and photophobia.   Cardiovascular: Positive for chest pain and near-syncope. Negative for leg swelling, orthopnea, palpitations, paroxysmal nocturnal dyspnea and syncope.   Respiratory: Negative for cough and wheezing.    Skin: Negative for poor wound healing and rash.   Musculoskeletal: Negative for arthritis and joint swelling.   Gastrointestinal: Negative for bloating, abdominal pain, hematemesis and hematochezia.   Neurological: Negative for dizziness and focal weakness.   Psychiatric/Behavioral: Negative for depression and suicidal ideas.       OBJECTIVE:   Vital Signs  Vitals:    05/22/23 1324   BP: 133/83   Pulse: 71   SpO2: 100%     Estimated body mass index is 35.2 kg/m² as calculated from the following:    Height as of this encounter: 167.6 cm (65.98\").    Weight as of this encounter: 98.9 kg (218 lb).    Vitals reviewed.   Constitutional:       Appearance: Healthy appearance. Not in distress.   Neck:      Vascular: No JVR. JVD normal.   Pulmonary:      Effort: Pulmonary effort is normal.      Breath sounds: Normal breath sounds. No wheezing. No rhonchi. No rales.   Chest:      Chest wall: Not tender to palpatation.   Cardiovascular:      PMI at left midclavicular line. Normal rate. Regular rhythm. Normal S1. Normal S2.      Murmurs: There is no murmur.      No gallop. No click. No rub.   Pulses:     Intact distal pulses.   Edema:     Peripheral edema absent.   Abdominal:      General: Bowel sounds are normal.      Palpations: Abdomen is soft.      Tenderness: There is no abdominal tenderness.   Musculoskeletal: Normal range of motion.         General: No tenderness. Skin:     " General: Skin is warm and dry.   Neurological:      General: No focal deficit present.      Mental Status: Alert and oriented to person, place and time.         Procedures         BUN   Date Value Ref Range Status   05/22/2023 26 (H) 8 - 23 mg/dL Final     Creatinine   Date Value Ref Range Status   05/22/2023 0.85 0.57 - 1.00 mg/dL Final     Potassium   Date Value Ref Range Status   05/22/2023 3.7 3.5 - 5.2 mmol/L Final     ALT (SGPT)   Date Value Ref Range Status   05/22/2023 23 1 - 33 U/L Final     AST (SGOT)   Date Value Ref Range Status   05/22/2023 19 1 - 32 U/L Final           ASSESSMENT:      Diagnosis Plan   1. Precordial pain  Cardiac Monitoring    Vital Signs - Once    Vital Signs - As Needed    Pulse Oximetry    Oxygen Therapy- Nasal Cannula; Titrate 1-6 LPM Per SpO2; 92% or Greater    Insert Peripheral IV    sodium chloride 0.9 % flush 10 mL    nitroglycerin (NITROSTAT) SL tablet 0.4 mg    NPO Diet NPO Type: Strict NPO    Bathroom Privileges With Assistance    CBC & Differential    Comprehensive Metabolic Panel    Troponin    D-Dimer    proBNP    Cardiac Monitoring    Vital Signs - Once    Insert Peripheral IV    NPO Diet NPO Type: Strict NPO    Bathroom Privileges With Assistance    Troponin    Troponin    D-Dimer    D-Dimer    proBNP    proBNP    High Sensitivity Troponin T 2Hr    High Sensitivity Troponin T 2Hr    Adult Transthoracic Echo Complete W/ Cont if Necessary Per Protocol      2. Shortness of breath  Cardiac Monitoring    Vital Signs - Once    Vital Signs - As Needed    Pulse Oximetry    Oxygen Therapy- Nasal Cannula; Titrate 1-6 LPM Per SpO2; 92% or Greater    Insert Peripheral IV    sodium chloride 0.9 % flush 10 mL    nitroglycerin (NITROSTAT) SL tablet 0.4 mg    NPO Diet NPO Type: Strict NPO    Bathroom Privileges With Assistance    CBC & Differential    Comprehensive Metabolic Panel    Troponin    D-Dimer    proBNP    Cardiac Monitoring    Vital Signs - Once    Insert Peripheral IV     NPO Diet NPO Type: Strict NPO    Bathroom Privileges With Assistance    Troponin    Troponin    D-Dimer    D-Dimer    proBNP    proBNP    High Sensitivity Troponin T 2Hr    High Sensitivity Troponin T 2Hr            PLAN OF CARE:     Exertional chest pain -concerning for anginal equivalent.  Patient cannot perform at least 5 METS.  She already had caffeine today.  We will plan for nuclear stress study in the morning as her labs were unremarkable and her EKG did not show acute ischemia.  Echocardiogram today showed normal EF with no significant valvular heart disease  Near syncope -etiology uncertain.  Normal echocardiogram today.  We will plan for Holter monitor after stress study tomorrow  Hypertension -fairly well controlled today in clinic    Return to clinic 6-month             Discharge Medications      ASK your doctor about these medications      Instructions Start Date   levothyroxine 200 MCG tablet  Commonly known as: SYNTHROID, LEVOTHROID   200 mcg, Oral, Daily      olmesartan-hydrochlorothiazide 40-25 MG per tablet  Commonly known as: BENICAR HCT   1 tablet, Oral, Daily             Thank you for allowing me to participate in the care of your patient,      Sincerely,   Silvano Franco Jr, MD  [facility]  05/22/23  17:17 EDT

## 2023-05-22 NOTE — PROGRESS NOTES
Procedure   Procedures     Adult ECG Report     Name: Christy You   Age: 62 y.o.   Gender: female       Rate: 66   Rhythm: normal sinus rhythm   QRS Axis: nml   MA Interval: 171   QRS Duration: 109   QTc: 455   Voltages: .   Conduction Disturbances: none   Other Abnormalities: Nonspecific ST abnormality     Narrative Interpretation: Borderline EKG normal sinus rhythm nonspecific ST abnormality no change noted previous EKG 2/21/2020  Answers for HPI/ROS submitted by the patient on 5/22/2023  Please describe your symptoms.: Chest tightness, lightheaded.  Have you had these symptoms before?: Yes  How long have you been having these symptoms?: 1-2 weeks  Please list any medications you are currently taking for this condition.: None  Please describe any probable cause for these symptoms. : Possible heart condition.  What is the primary reason for your visit?: Other

## 2023-05-23 ENCOUNTER — HOSPITAL ENCOUNTER (OUTPATIENT)
Dept: CARDIOLOGY | Facility: HOSPITAL | Age: 63
Discharge: HOME | End: 2023-05-23
Admitting: INTERNAL MEDICINE
Payer: COMMERCIAL

## 2023-05-23 VITALS — BODY MASS INDEX: 35.04 KG/M2 | WEIGHT: 218.03 LBS | HEIGHT: 66 IN

## 2023-05-23 DIAGNOSIS — I10 ESSENTIAL (PRIMARY) HYPERTENSION: Chronic | ICD-10-CM

## 2023-05-23 DIAGNOSIS — R07.2 PRECORDIAL PAIN: ICD-10-CM

## 2023-05-23 LAB
BH CV NUCLEAR PRIOR STUDY: 2
BH CV REST NUCLEAR ISOTOPE DOSE: 10.8 MCI
BH CV STRESS BP STAGE 1: NORMAL
BH CV STRESS COMMENTS STAGE 1: NORMAL
BH CV STRESS DOSE REGADENOSON STAGE 1: 0.4
BH CV STRESS DURATION MIN STAGE 1: 0
BH CV STRESS DURATION SEC STAGE 1: 10
BH CV STRESS HR STAGE 1: 101
BH CV STRESS NUCLEAR ISOTOPE DOSE: 34.6 MCI
BH CV STRESS PROTOCOL 1: NORMAL
BH CV STRESS RECOVERY BP: NORMAL MMHG
BH CV STRESS RECOVERY HR: 83 BPM
BH CV STRESS STAGE 1: 1
LV EF NUC BP: 70 %
MAXIMAL PREDICTED HEART RATE: 156 BPM
PERCENT MAX PREDICTED HR: 64.74 %
STRESS BASELINE BP: NORMAL MMHG
STRESS BASELINE HR: 74 BPM
STRESS PERCENT HR: 76 %
STRESS POST EXERCISE DUR SEC: 10 SEC
STRESS POST PEAK BP: NORMAL MMHG
STRESS POST PEAK HR: 101 BPM
STRESS TARGET HR: 133 BPM

## 2023-05-23 PROCEDURE — 93017 CV STRESS TEST TRACING ONLY: CPT

## 2023-05-23 PROCEDURE — 0 TECHNETIUM TETROFOSMIN KIT: Performed by: INTERNAL MEDICINE

## 2023-05-23 PROCEDURE — 25010000002 REGADENOSON 0.4 MG/5ML SOLUTION: Performed by: INTERNAL MEDICINE

## 2023-05-23 PROCEDURE — 78452 HT MUSCLE IMAGE SPECT MULT: CPT

## 2023-05-23 PROCEDURE — A9502 TC99M TETROFOSMIN: HCPCS | Performed by: INTERNAL MEDICINE

## 2023-05-23 PROCEDURE — 25010000002 AMINOPHYLLINE PER 250 MG: Performed by: INTERNAL MEDICINE

## 2023-05-23 RX ORDER — OLMESARTAN MEDOXOMIL AND HYDROCHLOROTHIAZIDE 40/25 40; 25 MG/1; MG/1
1 TABLET ORAL DAILY
Qty: 30 TABLET | Refills: 0 | Status: SHIPPED | OUTPATIENT
Start: 2023-05-23

## 2023-05-23 RX ORDER — REGADENOSON 0.08 MG/ML
0.4 INJECTION, SOLUTION INTRAVENOUS
Status: COMPLETED | OUTPATIENT
Start: 2023-05-23 | End: 2023-05-23

## 2023-05-23 RX ORDER — AMINOPHYLLINE DIHYDRATE 25 MG/ML
125 INJECTION, SOLUTION INTRAVENOUS ONCE
Status: COMPLETED | OUTPATIENT
Start: 2023-05-23 | End: 2023-05-23

## 2023-05-23 RX ADMIN — TETROFOSMIN 1 DOSE: 1.38 INJECTION, POWDER, LYOPHILIZED, FOR SOLUTION INTRAVENOUS at 14:15

## 2023-05-23 RX ADMIN — AMINOPHYLLINE 125 MG: 25 INJECTION, SOLUTION INTRAVENOUS at 14:22

## 2023-05-23 RX ADMIN — REGADENOSON 0.4 MG: 0.08 INJECTION, SOLUTION INTRAVENOUS at 14:15

## 2023-05-23 RX ADMIN — TETROFOSMIN 1 DOSE: 1.38 INJECTION, POWDER, LYOPHILIZED, FOR SOLUTION INTRAVENOUS at 13:17

## 2023-05-23 NOTE — TELEPHONE ENCOUNTER
Rx Refill Note  Requested Prescriptions     Pending Prescriptions Disp Refills   • olmesartan-hydrochlorothiazide (BENICAR HCT) 40-25 MG per tablet [Pharmacy Med Name: OLMESARTAN MEDOX/HCTZ 40-25MG TAB] 30 tablet      Sig: TAKE 1 TABLET BY MOUTH DAILY      Last office visit with prescribing clinician: 5/1/2023   Last telemedicine visit with prescribing clinician: Visit date not found   Next office visit with prescribing clinician: Visit date not found       {TIP  Please add Last Relevant Lab Date if appropriate: 05/01/23                 Would you like a call back once the refill request has been completed: [] Yes [] No    If the office needs to give you a call back, can they leave a voicemail: [] Yes [] No    Maryanne Perry MA  05/23/23, 17:38 EDT

## 2023-05-23 NOTE — PROGRESS NOTES
Please let patient know this is a normal stress study.  We will follow the results of Holter monitor.

## 2023-06-14 DIAGNOSIS — I10 ESSENTIAL (PRIMARY) HYPERTENSION: Chronic | ICD-10-CM

## 2023-06-14 RX ORDER — OLMESARTAN MEDOXOMIL AND HYDROCHLOROTHIAZIDE 40/25 40; 25 MG/1; MG/1
1 TABLET ORAL DAILY
Qty: 90 TABLET | Refills: 0 | Status: SHIPPED | OUTPATIENT
Start: 2023-06-14

## 2023-06-14 NOTE — TELEPHONE ENCOUNTER
Rx Refill Note  Requested Prescriptions     Pending Prescriptions Disp Refills    olmesartan-hydrochlorothiazide (BENICAR HCT) 40-25 MG per tablet 90 tablet 1     Sig: Take 1 tablet by mouth Daily.      Last office visit with prescribing clinician: 5/1/2023   Last telemedicine visit with prescribing clinician: Visit date not found   Next office visit with prescribing clinician: Visit date not found       {TIP  Please add Last Relevant Lab Date if appropriate: 05/22/23                 Would you like a call back once the refill request has been completed: [] Yes [] No    If the office needs to give you a call back, can they leave a voicemail: [] Yes [] No    Maryanne Perry MA  06/14/23, 15:27 EDT

## 2023-09-06 DIAGNOSIS — I10 ESSENTIAL (PRIMARY) HYPERTENSION: Chronic | ICD-10-CM

## 2023-09-07 RX ORDER — OLMESARTAN MEDOXOMIL AND HYDROCHLOROTHIAZIDE 40/25 40; 25 MG/1; MG/1
1 TABLET ORAL DAILY
Qty: 90 TABLET | Refills: 1 | Status: SHIPPED | OUTPATIENT
Start: 2023-09-07

## 2023-09-25 DIAGNOSIS — E03.9 ADULT HYPOTHYROIDISM: ICD-10-CM

## 2023-09-25 RX ORDER — LEVOTHYROXINE SODIUM 0.2 MG/1
200 TABLET ORAL DAILY
Qty: 90 TABLET | Refills: 0 | Status: SHIPPED | OUTPATIENT
Start: 2023-09-25

## 2023-09-25 NOTE — TELEPHONE ENCOUNTER
Rx Refill Note  Requested Prescriptions     Pending Prescriptions Disp Refills    levothyroxine (SYNTHROID, LEVOTHROID) 200 MCG tablet [Pharmacy Med Name: Levothyroxine Sodium Oral Tablet 200  MCG  Tablet] 90 tablet 0     Sig: TAKE 1 TABLET BY MOUTH DAILY      Last office visit with prescribing clinician: 5/1/2023   Last telemedicine visit with prescribing clinician: Visit date not found   Next office visit with prescribing clinician: Visit date not found                         Would you like a call back once the refill request has been completed: [] Yes [] No    If the office needs to give you a call back, can they leave a voicemail: [] Yes [] No    Melia Segal MA  09/25/23, 10:32 EDT

## 2023-11-14 ENCOUNTER — OFFICE VISIT (OUTPATIENT)
Dept: FAMILY MEDICINE CLINIC | Facility: CLINIC | Age: 63
End: 2023-11-14
Payer: MEDICARE

## 2023-11-14 VITALS
WEIGHT: 196 LBS | SYSTOLIC BLOOD PRESSURE: 131 MMHG | OXYGEN SATURATION: 98 % | DIASTOLIC BLOOD PRESSURE: 83 MMHG | HEART RATE: 84 BPM | HEIGHT: 66 IN | BODY MASS INDEX: 31.5 KG/M2 | TEMPERATURE: 97.7 F

## 2023-11-14 DIAGNOSIS — L65.9 HAIR LOSS: ICD-10-CM

## 2023-11-14 DIAGNOSIS — E03.9 ADULT HYPOTHYROIDISM: ICD-10-CM

## 2023-11-14 DIAGNOSIS — N93.9 ABNORMAL VAGINAL BLEEDING: ICD-10-CM

## 2023-11-14 DIAGNOSIS — I10 BENIGN ESSENTIAL HTN: ICD-10-CM

## 2023-11-14 DIAGNOSIS — R10.2 PELVIC AND PERINEAL PAIN: ICD-10-CM

## 2023-11-14 DIAGNOSIS — R53.83 OTHER FATIGUE: ICD-10-CM

## 2023-11-14 DIAGNOSIS — I10 ESSENTIAL (PRIMARY) HYPERTENSION: Primary | Chronic | ICD-10-CM

## 2023-11-14 PROCEDURE — 1159F MED LIST DOCD IN RCRD: CPT | Performed by: NURSE PRACTITIONER

## 2023-11-14 PROCEDURE — 99214 OFFICE O/P EST MOD 30 MIN: CPT | Performed by: NURSE PRACTITIONER

## 2023-11-14 PROCEDURE — 3075F SYST BP GE 130 - 139MM HG: CPT | Performed by: NURSE PRACTITIONER

## 2023-11-14 PROCEDURE — 1160F RVW MEDS BY RX/DR IN RCRD: CPT | Performed by: NURSE PRACTITIONER

## 2023-11-14 PROCEDURE — 3079F DIAST BP 80-89 MM HG: CPT | Performed by: NURSE PRACTITIONER

## 2023-11-14 NOTE — PROGRESS NOTES
"Chief Complaint  Hypertension (F/u htn, pt states bp has been low and has cut bp meds in half )    Subjective        Christy You presents to [facility]  History of Present Illness patient is here for follow-up on hypertension.  She reports that she has intentionally lost about 40 pounds and has had to cut her blood pressure medication in half due to orthostasis and hypotension.  At half the dose her blood pressure has been staying controlled.  Monitors at home.    She also reports an urgent care visit on 10/31/2023 for sudden onset of pelvic pain.  She woke up on 10/30/2023 with mild pelvic pain that continued to worsen until Tuesday afternoon when it became intolerable and she went to the .  There she was diagnosed with a UTI was started on Macrobid and took 1 dose when she was called and told she does not have a UTI and should not take the antibiotics.  Pelvic pain was by then resolved.  Pain was located across low pelvis with radiation into bilateral groin.  She did not have dysuria, frequency at the time think she stays hydrated.  She did not note any hematuria.  She has never had kidney stones in the past.  No fever or chills.    She also reports that she had 1 episode about a month prior of abnormal vaginal bleeding.  She is postmenopausal.  It was not accompanied by pain.  No ICD prior to event.  She reports her last Pap was about 20 years ago and thinks they were normal.  She had ablation for fibroids prior to menopause.    She is not sure that her thyroid medicine is working as well as it should.  Since her ablation she feels like her thyroid has never really been \"right\".  She has had significant hair loss, fatigue and would like to have her thyroid rechecked.      Objective   Vital Signs:  /83   Pulse 84   Temp 97.7 °F (36.5 °C)   Ht 167.6 cm (66\")   Wt 88.9 kg (196 lb)   SpO2 98%   BMI 31.64 kg/m²   Estimated body mass index is 31.64 kg/m² as calculated from " "the following:    Height as of this encounter: 167.6 cm (66\").    Weight as of this encounter: 88.9 kg (196 lb).               Physical Exam  Vitals and nursing note reviewed.   Constitutional:       General: She is not in acute distress.     Appearance: She is well-developed. She is not diaphoretic.   HENT:      Head: Normocephalic and atraumatic.   Eyes:      General:         Right eye: No discharge.         Left eye: No discharge.      Conjunctiva/sclera: Conjunctivae normal.   Pulmonary:      Effort: Pulmonary effort is normal.   Musculoskeletal:         General: No deformity.      Comments: Gait smooth and steady   Skin:     General: Skin is warm and dry.   Neurological:      Mental Status: She is alert and oriented to person, place, and time.   Psychiatric:         Mood and Affect: Mood normal.         Behavior: Behavior normal.        Result Review :                   Assessment and Plan   Diagnoses and all orders for this visit:    1. Essential (primary) hypertension (Primary)  -     Comprehensive Metabolic Panel  -     CBC & Differential  -     olmesartan-hydrochlorothiazide (BENICAR HCT) 40-25 MG per tablet; Take 0.5 tablets by mouth Daily.    2. Benign essential HTN  -     Comprehensive Metabolic Panel  -     CBC & Differential    3. Adult hypothyroidism  -     Thyroid Peroxidase Antibody  -     TSH  -     T4, Free    4. Abnormal vaginal bleeding  -     CBC & Differential  -     US Pelvis Complete; Future  -     US Testicular or Ovarian Vascular Limited; Future    5. Pelvic and perineal pain  -     US Pelvis Complete; Future  -     US Testicular or Ovarian Vascular Limited; Future    6. Other fatigue  -     Thyroid Peroxidase Antibody  -     Vitamin B12  -     Iron Profile  -     Comprehensive Metabolic Panel  -     Ferritin  -     CBC & Differential    7. Hair loss  -     Thyroid Peroxidase Antibody  -     T4, Free  -     Vitamin B12  -     Iron Profile  -     Ferritin  -     CBC & " Differential      Hypertension: Patient's blood pressure is at goal.  She will continue half dose of olmesartan HCTZ and when refill needed will send at lower dose.    Pelvic pain and abnormal vaginal bleeding: Patient should have a Pap and exam.  The lab is already closed and patient is willing to return on Thursday for these.  I will also order pelvic ultrasound for further evaluation.  Reviewed urgent care notes which showed hematuria and Leuks but cx negative.  Sx sound like renal stone with quick onset and then resolution without tx.  She is currently asymptomatic.  Blood she noted appr 1 month ago may have been from urine, although she feels certain it was vaginal.     Will get TSH and further eval of thyroid since she feels like medication not working.  May have caused AUB if off.  We could consider Synthroid brand or other thyroid product.                 Follow Up   No follow-ups on file.  Patient was given instructions and counseling regarding her condition or for health maintenance advice. Please see specific information pulled into the AVS if appropriate.         Answers submitted by the patient for this visit:  Other (Submitted on 11/7/2023)  Please describe your symptoms.: Pelvic pain, maybe hip joint pain.  Have you had these symptoms before?: No  How long have you been having these symptoms?: Greater than 2 weeks  Primary Reason for Visit (Submitted on 11/7/2023)  What is the primary reason for your visit?: Other

## 2023-11-16 ENCOUNTER — OFFICE VISIT (OUTPATIENT)
Dept: FAMILY MEDICINE CLINIC | Facility: CLINIC | Age: 63
End: 2023-11-16
Payer: MEDICARE

## 2023-11-16 VITALS
TEMPERATURE: 97.5 F | HEIGHT: 66 IN | OXYGEN SATURATION: 99 % | HEART RATE: 76 BPM | DIASTOLIC BLOOD PRESSURE: 84 MMHG | BODY MASS INDEX: 31.64 KG/M2 | SYSTOLIC BLOOD PRESSURE: 142 MMHG

## 2023-11-16 DIAGNOSIS — N72 CERVICITIS: ICD-10-CM

## 2023-11-16 DIAGNOSIS — Z12.4 SCREENING FOR CERVICAL CANCER: ICD-10-CM

## 2023-11-16 DIAGNOSIS — N93.9 ABNORMAL VAGINAL BLEEDING: Primary | ICD-10-CM

## 2023-11-16 PROCEDURE — 1159F MED LIST DOCD IN RCRD: CPT | Performed by: NURSE PRACTITIONER

## 2023-11-16 PROCEDURE — 3077F SYST BP >= 140 MM HG: CPT | Performed by: NURSE PRACTITIONER

## 2023-11-16 PROCEDURE — 3079F DIAST BP 80-89 MM HG: CPT | Performed by: NURSE PRACTITIONER

## 2023-11-16 PROCEDURE — 1160F RVW MEDS BY RX/DR IN RCRD: CPT | Performed by: NURSE PRACTITIONER

## 2023-11-16 PROCEDURE — 99214 OFFICE O/P EST MOD 30 MIN: CPT | Performed by: NURSE PRACTITIONER

## 2023-11-16 RX ORDER — OLMESARTAN MEDOXOMIL AND HYDROCHLOROTHIAZIDE 40/25 40; 25 MG/1; MG/1
0.5 TABLET ORAL DAILY
Start: 2023-11-16

## 2023-11-17 ENCOUNTER — TELEPHONE (OUTPATIENT)
Dept: FAMILY MEDICINE CLINIC | Facility: CLINIC | Age: 63
End: 2023-11-17

## 2023-11-17 DIAGNOSIS — E03.9 ADULT HYPOTHYROIDISM: Primary | ICD-10-CM

## 2023-11-17 LAB
ALBUMIN SERPL-MCNC: 4.5 G/DL (ref 3.5–5.2)
ALBUMIN/GLOB SERPL: 2.1 G/DL
ALP SERPL-CCNC: 87 U/L (ref 39–117)
ALT SERPL-CCNC: 16 U/L (ref 1–33)
AST SERPL-CCNC: 11 U/L (ref 1–32)
BASOPHILS # BLD AUTO: 0.03 10*3/MM3 (ref 0–0.2)
BASOPHILS NFR BLD AUTO: 0.6 % (ref 0–1.5)
BILIRUB SERPL-MCNC: 0.4 MG/DL (ref 0–1.2)
BUN SERPL-MCNC: 21 MG/DL (ref 8–23)
BUN/CREAT SERPL: 30.4 (ref 7–25)
CALCIUM SERPL-MCNC: 10 MG/DL (ref 8.6–10.5)
CHLORIDE SERPL-SCNC: 102 MMOL/L (ref 98–107)
CO2 SERPL-SCNC: 29.9 MMOL/L (ref 22–29)
CREAT SERPL-MCNC: 0.69 MG/DL (ref 0.57–1)
EGFRCR SERPLBLD CKD-EPI 2021: 96.5 ML/MIN/1.73
EOSINOPHIL # BLD AUTO: 0.07 10*3/MM3 (ref 0–0.4)
EOSINOPHIL NFR BLD AUTO: 1.3 % (ref 0.3–6.2)
ERYTHROCYTE [DISTWIDTH] IN BLOOD BY AUTOMATED COUNT: 12.1 % (ref 12.3–15.4)
FERRITIN SERPL-MCNC: 696 NG/ML (ref 13–150)
GLOBULIN SER CALC-MCNC: 2.1 GM/DL
GLUCOSE SERPL-MCNC: 94 MG/DL (ref 65–99)
HCT VFR BLD AUTO: 42.6 % (ref 34–46.6)
HGB BLD-MCNC: 14.4 G/DL (ref 12–15.9)
IMM GRANULOCYTES # BLD AUTO: 0.01 10*3/MM3 (ref 0–0.05)
IMM GRANULOCYTES NFR BLD AUTO: 0.2 % (ref 0–0.5)
IRON SATN MFR SERPL: 40 % (ref 20–50)
IRON SERPL-MCNC: 143 MCG/DL (ref 37–145)
LYMPHOCYTES # BLD AUTO: 1.82 10*3/MM3 (ref 0.7–3.1)
LYMPHOCYTES NFR BLD AUTO: 34.2 % (ref 19.6–45.3)
MCH RBC QN AUTO: 30.4 PG (ref 26.6–33)
MCHC RBC AUTO-ENTMCNC: 33.8 G/DL (ref 31.5–35.7)
MCV RBC AUTO: 90.1 FL (ref 79–97)
MONOCYTES # BLD AUTO: 0.35 10*3/MM3 (ref 0.1–0.9)
MONOCYTES NFR BLD AUTO: 6.6 % (ref 5–12)
NEUTROPHILS # BLD AUTO: 3.04 10*3/MM3 (ref 1.7–7)
NEUTROPHILS NFR BLD AUTO: 57.1 % (ref 42.7–76)
NRBC BLD AUTO-RTO: 0.2 /100 WBC (ref 0–0.2)
PLATELET # BLD AUTO: 226 10*3/MM3 (ref 140–450)
POTASSIUM SERPL-SCNC: 4.5 MMOL/L (ref 3.5–5.2)
PROT SERPL-MCNC: 6.6 G/DL (ref 6–8.5)
RBC # BLD AUTO: 4.73 10*6/MM3 (ref 3.77–5.28)
SODIUM SERPL-SCNC: 140 MMOL/L (ref 136–145)
T4 FREE SERPL-MCNC: 2.45 NG/DL (ref 0.93–1.7)
THYROPEROXIDASE AB SERPL-ACNC: 13 IU/ML (ref 0–34)
TIBC SERPL-MCNC: 359 MCG/DL
TSH SERPL DL<=0.005 MIU/L-ACNC: 0.26 UIU/ML (ref 0.27–4.2)
UIBC SERPL-MCNC: 216 MCG/DL (ref 112–346)
VIT B12 SERPL-MCNC: 426 PG/ML (ref 211–946)
WBC # BLD AUTO: 5.32 10*3/MM3 (ref 3.4–10.8)

## 2023-11-17 RX ORDER — LEVOTHYROXINE SODIUM 175 UG/1
175 TABLET ORAL DAILY
Qty: 90 TABLET | Refills: 0 | Status: SHIPPED | OUTPATIENT
Start: 2023-11-17

## 2023-11-17 NOTE — TELEPHONE ENCOUNTER
Caller: Christy You    Relationship: Self    Best call back number: [phone]     What was the call regarding: PATIENT WAS SPEAKING TO ALLY AND THE PATIENT HAD TO GET OFF THE CALL AND CALL BACK. PLEASE ADVISE.

## 2023-11-20 LAB
A VAGINAE DNA VAG QL NAA+PROBE: NORMAL SCORE
BVAB2 DNA VAG QL NAA+PROBE: NORMAL SCORE
C ALBICANS DNA VAG QL NAA+PROBE: NEGATIVE
C GLABRATA DNA VAG QL NAA+PROBE: NEGATIVE
C TRACH DNA VAG QL NAA+PROBE: NEGATIVE
HSV1 DNA SPEC QL NAA+PROBE: NEGATIVE
HSV2 DNA SPEC QL NAA+PROBE: NEGATIVE
MEGA1 DNA VAG QL NAA+PROBE: NORMAL SCORE
N GONORRHOEA DNA VAG QL NAA+PROBE: NEGATIVE
T VAGINALIS DNA VAG QL NAA+PROBE: NEGATIVE

## 2023-11-23 LAB
AGE GDLN ACOG TESTING: NORMAL
CYTOLOGIST CVX/VAG CYTO: ABNORMAL
CYTOLOGY CVX/VAG DOC CYTO: ABNORMAL
CYTOLOGY CVX/VAG DOC THIN PREP: ABNORMAL
DX ICD CODE: ABNORMAL
HIV 1 & 2 AB SER-IMP: ABNORMAL
HPV GENOTYPE REFLEX: ABNORMAL
HPV I/H RISK 4 DNA CVX QL PROBE+SIG AMP: POSITIVE
HPV16 DNA CVX QL PROBE+SIG AMP: NEGATIVE
HPV18+45 E6+E7 MRNA CVX QL NAA+PROBE: NEGATIVE
OTHER STN SPEC: ABNORMAL
STAT OF ADQ CVX/VAG CYTO-IMP: ABNORMAL

## 2023-12-03 NOTE — PROGRESS NOTES
"Chief Complaint  Gynecologic Exam (Pap smear )    Subjective        Christy You presents to [facility]  Gynecologic Exam     patient is here for Pap.  She has not had Pap in at least 20 years.  Does not know of any abnormal Paps previously.  She has not been sexually active in many years and has no unusual vaginal discharge.  She did have episode of vaginal bleeding which is why we are doing Pap today.  Patient has not yet had ultrasound of pelvis that I ordered to be done emergently after last visit.    Objective   Vital Signs:  /84   Pulse 76   Temp 97.5 °F (36.4 °C)   Ht 167.6 cm (66\")   SpO2 99%   BMI 31.64 kg/m²   Estimated body mass index is 31.64 kg/m² as calculated from the following:    Height as of this encounter: 167.6 cm (66\").    Weight as of 11/14/23: 88.9 kg (196 lb).               Physical Exam  Vitals and nursing note reviewed.   Constitutional:       General: She is not in acute distress.     Appearance: She is well-developed. She is not ill-appearing or diaphoretic.   HENT:      Head: Normocephalic and atraumatic.   Eyes:      General:         Right eye: No discharge.         Left eye: No discharge.      Conjunctiva/sclera: Conjunctivae normal.   Abdominal:      Palpations: Abdomen is soft.      Tenderness: There is no abdominal tenderness.   Genitourinary:     Labia:         Right: No rash.         Left: No rash.       Vagina: Normal.      Cervix: Friability, erythema and cervical bleeding present.      Adnexa:         Right: No tenderness or fullness.          Left: No tenderness or fullness.     Musculoskeletal:         General: No deformity.      Comments: Gait smooth and steady   Lymphadenopathy:      Lower Body: No right inguinal adenopathy. No left inguinal adenopathy.   Skin:     General: Skin is warm and dry.   Neurological:      Mental Status: She is alert and oriented to person, place, and time.   Psychiatric:         Mood and Affect: Mood " normal.         Behavior: Behavior normal.        Result Review :                   Assessment and Plan   Diagnoses and all orders for this visit:    1. Abnormal vaginal bleeding (Primary)    2. Screening for cervical cancer  -     IGP,Aptima HPV,Age Gdln    3. Cervicitis  -     NuSwab VG+ & HSV    Other orders  -     IGP, Aptima HPV, Rfx 16 / 18,45  -     HPV Genotypes 16 / 18,45 - ,    Cervix was a little friable and had slightly abnormal appearance.  Pap done.  I have also ordered HPV.  I will have MA or  get her scheduled today for her pelvic ultrasound for abnormal vaginal bleeding which is concerning.  Once this is back if GYN referral needed will refer.    Will get new swab for cervicitis which may be a etiology for her unusual vaginal bleeding.If medication indicated by NuSwab will treat.  No vaginal odor or unexpected discharge on exam.    All discussed with patient as well as importance of getting ultrasound done as next step for etiology for abnormal vaginal bleeding.           Follow Up   No follow-ups on file.  Patient was given instructions and counseling regarding her condition or for health maintenance advice. Please see specific information pulled into the AVS if appropriate.

## 2023-12-04 ENCOUNTER — TELEPHONE (OUTPATIENT)
Dept: FAMILY MEDICINE CLINIC | Facility: CLINIC | Age: 63
End: 2023-12-04
Payer: MEDICARE

## 2023-12-19 ENCOUNTER — HOSPITAL ENCOUNTER (OUTPATIENT)
Dept: ULTRASOUND IMAGING | Facility: HOSPITAL | Age: 63
Discharge: HOME | End: 2023-12-19
Admitting: NURSE PRACTITIONER
Payer: MEDICARE

## 2023-12-19 DIAGNOSIS — R10.2 PELVIC AND PERINEAL PAIN: ICD-10-CM

## 2023-12-19 DIAGNOSIS — N93.9 ABNORMAL VAGINAL BLEEDING: Primary | ICD-10-CM

## 2023-12-19 DIAGNOSIS — N93.9 ABNORMAL VAGINAL BLEEDING: ICD-10-CM

## 2023-12-19 PROCEDURE — 76830 TRANSVAGINAL US NON-OB: CPT

## 2023-12-19 PROCEDURE — 76856 US EXAM PELVIC COMPLETE: CPT

## 2023-12-19 PROCEDURE — 93976 VASCULAR STUDY: CPT

## 2024-01-24 ENCOUNTER — PATIENT ROUNDING (BHMG ONLY) (OUTPATIENT)
Dept: OBSTETRICS AND GYNECOLOGY | Facility: CLINIC | Age: 64
End: 2024-01-24
Payer: MEDICARE

## 2024-01-24 ENCOUNTER — OFFICE VISIT (OUTPATIENT)
Dept: OBSTETRICS AND GYNECOLOGY | Facility: CLINIC | Age: 64
End: 2024-01-24
Payer: MEDICARE

## 2024-01-24 ENCOUNTER — PREP FOR SURGERY (OUTPATIENT)
Dept: OTHER | Facility: HOSPITAL | Age: 64
End: 2024-01-24
Payer: MEDICARE

## 2024-01-24 VITALS
SYSTOLIC BLOOD PRESSURE: 130 MMHG | HEIGHT: 66 IN | WEIGHT: 195.8 LBS | BODY MASS INDEX: 31.47 KG/M2 | DIASTOLIC BLOOD PRESSURE: 86 MMHG

## 2024-01-24 DIAGNOSIS — N84.1 ENDOCERVICAL POLYP: ICD-10-CM

## 2024-01-24 DIAGNOSIS — Z12.11 SCREENING FOR COLON CANCER: Primary | ICD-10-CM

## 2024-01-24 DIAGNOSIS — Z12.31 ENCOUNTER FOR SCREENING MAMMOGRAM FOR MALIGNANT NEOPLASM OF BREAST: ICD-10-CM

## 2024-01-24 DIAGNOSIS — B97.7 HPV IN FEMALE: ICD-10-CM

## 2024-01-24 DIAGNOSIS — Z76.89 ENCOUNTER TO ESTABLISH CARE WITH NEW DOCTOR: ICD-10-CM

## 2024-01-24 DIAGNOSIS — I10 ESSENTIAL (PRIMARY) HYPERTENSION: Chronic | ICD-10-CM

## 2024-01-24 DIAGNOSIS — R87.618 PAP SMEAR ABNORMALITY OF CERVIX/HUMAN PAPILLOMAVIRUS (HPV) POSITIVE: ICD-10-CM

## 2024-01-24 DIAGNOSIS — N95.0 PMB (POSTMENOPAUSAL BLEEDING): Primary | ICD-10-CM

## 2024-01-24 RX ORDER — OLMESARTAN MEDOXOMIL AND HYDROCHLOROTHIAZIDE 40/25 40; 25 MG/1; MG/1
1 TABLET ORAL DAILY
COMMUNITY

## 2024-01-24 RX ORDER — SODIUM CHLORIDE 0.9 % (FLUSH) 0.9 %
10 SYRINGE (ML) INJECTION AS NEEDED
OUTPATIENT
Start: 2024-01-24

## 2024-01-24 RX ORDER — SODIUM CHLORIDE 9 MG/ML
40 INJECTION, SOLUTION INTRAVENOUS AS NEEDED
OUTPATIENT
Start: 2024-01-24

## 2024-01-24 RX ORDER — SODIUM CHLORIDE 0.9 % (FLUSH) 0.9 %
10 SYRINGE (ML) INJECTION EVERY 12 HOURS SCHEDULED
OUTPATIENT
Start: 2024-01-24

## 2024-01-24 NOTE — LETTER
2024       No Recipients    Patient: Christy You   YOB: 1960   Date of Visit: 2024       Dear ANGIE Tracey,    Thank you for referring Christy You to me for evaluation. Below is a copy of my consult note.    If you have questions, please do not hesitate to call me. I look forward to following Christy along with you.         Sincerely,        Christina Gonzalez MD        CC:   No Recipients    New GYN Exam    CC- Here for abnormal pap smear and  VB    Christy You is a 63 y.o. female new patient who presents for abnormal pap smear and  VB. She had an ablation at age 43 and took HRT for < 1 year, none now. She started having spotting about 6 months ago. She had not had a pap > 20 year and saw her primary MD and her pap was normal with + HPV ( neg for 16, 18, 45).  On exam, she has an endocervical polyp that is too large to remove in the office. She will need to have a HS D&C for removal of this lesion. We also did a colpo and if her path shows dysplasia, we will also need to do a CKC at the time of her D&C and she is agreeable. Her US today shows a 5.4 cm AV uterus with an EL= 0.2 cm. Her ovaries appear normal. Her US was compared to her scan on 2023. .     OB History          0    Para   0    Term   0            AB        Living             SAB        IAB        Ectopic        Molar        Multiple        Live Births              Obstetric Comments   infertility               Menarche:9  Menopause:43- endometrial ablation  HRT:took < 1 year, none now  Current contraception: post menopausal status  History of abnormal Pap smear: yes -  2023- nl pap + HPV  History of abnormal mammogram: no  Family history of uterine, colon or ovarian cancer: yes - mom uterine cancer (?)   Family history of breast cancer: no  STD's: none  Last pap smear:2023- normal pap + H_PV  Gardasil: missed  CHUCK: none      Health Maintenance   Topic Date Due   • DXA  SCAN  Never done   • COVID-19 Vaccine (1) Never done   • ZOSTER VACCINE (1 of 2) Never done   • ANNUAL WELLNESS VISIT  Never done   • RSV Vaccine - Adults (1 - 1-dose 60+ series) Never done   • COLORECTAL CANCER SCREENING  07/20/2019   • LIPID PANEL  10/08/2022   • INFLUENZA VACCINE  Never done   • Pneumococcal Vaccine 65+ (1 of 1 - PCV) Never done   • BMI FOLLOWUP  05/22/2024   • MAMMOGRAM  02/07/2026   • TDAP/TD VACCINES (2 - Td or Tdap) 04/15/2026   • PAP SMEAR  11/16/2026   • HEPATITIS C SCREENING  Completed       Past Medical History:   Diagnosis Date   • Abnormal Pap smear of cervix    • Allergic     Year round   • Anxiety    • Arthritis     hands, knees and hips   • Benign essential hypertension    • Female infertility    • Headache     silent migraines with the vision in one eye going white.   • Hyperlipidemia    • Hyperthyroidism    • Hypothyroidism 2005    Hashimoto's Disease       Past Surgical History:   Procedure Laterality Date   • CATARACT EXTRACTION     • ENDOMETRIAL ABLATION  October 2001   • JOINT REPLACEMENT  June 2012   • REPLACEMENT TOTAL KNEE Left    • THYROID SURGERY     • THYROIDECTOMY     • WISDOM TOOTH EXTRACTION           Current Outpatient Medications:   •  levothyroxine (SYNTHROID, LEVOTHROID) 175 MCG tablet, TAKE 1 TABLET BY MOUTH DAILY, Disp: 30 tablet, Rfl: 0  •  olmesartan-hydrochlorothiazide (BENICAR HCT) 40-25 MG per tablet, Take 1 tablet by mouth Daily., Disp: , Rfl:     Current Facility-Administered Medications:   •  aspirin chewable tablet 324 mg, 324 mg, Oral, Once, Epley, James, APRN  •  nitroglycerin (NITROSTAT) SL tablet 0.4 mg, 0.4 mg, Sublingual, Q5 Min PRN, Silvano Franco Jr., MD  •  sodium chloride 0.9 % flush 10 mL, 10 mL, Intravenous, PRN, Silvano Franco Jr., MD    Allergies   Allergen Reactions   • Other Other (See Comments)     PATCH LEFT RED WHELP UNDER PATCH AFTER LEFT ON 1 WEEK.   • Adhesive Tape Rash       Social History     Tobacco Use   • Smoking status: Never  "    Passive exposure: Never   • Smokeless tobacco: Never   Vaping Use   • Vaping Use: Never used   Substance Use Topics   • Alcohol use: Never     Comment: caffeine use- coffee   • Drug use: No       Family History   Problem Relation Age of Onset   • Hypertension Father    • Other Father         leukopenia   • Cancer Father         Leukemia   • Uterine cancer Mother    • Hypertension Mother    • Cancer Mother         Endometrial   • ALS Mother    • Cancer Sister         Thyroid Cancer   • Coronary artery disease Paternal Grandmother    • Heart disease Paternal Grandmother    • Cancer Maternal Grandfather         Lung, life long smoker   • Cancer Other         lung   • Breast cancer Neg Hx    • Ovarian cancer Neg Hx    • Colon cancer Neg Hx    • Deep vein thrombosis Neg Hx    • Pulmonary embolism Neg Hx        Review of Systems   Constitutional:  Positive for activity change. Negative for appetite change, fatigue, fever and unexpected weight change.   Eyes:  Negative for photophobia and visual disturbance.   Respiratory:  Negative for cough and shortness of breath.    Cardiovascular:  Negative for chest pain and palpitations.   Gastrointestinal:  Negative for abdominal distention, abdominal pain, constipation, diarrhea and nausea.   Endocrine: Negative for cold intolerance and heat intolerance.   Genitourinary:  Positive for vaginal bleeding. Negative for dyspareunia, dysuria, menstrual problem, pelvic pain and vaginal discharge.   Musculoskeletal:  Negative for back pain.   Skin:  Negative for color change and rash.   Neurological:  Negative for headaches.   Hematological:  Negative for adenopathy. Does not bruise/bleed easily.   Psychiatric/Behavioral:  Negative for dysphoric mood. The patient is nervous/anxious.        /86   Ht 167.6 cm (66\")   Wt 88.8 kg (195 lb 12.8 oz)   BMI 31.60 kg/m²     Physical Exam  Vitals and nursing note reviewed. Exam conducted with a chaperone present.   Constitutional:       " Appearance: Normal appearance. She is well-developed. She is obese.   HENT:      Head: Normocephalic and atraumatic.   Eyes:      General: No scleral icterus.     Conjunctiva/sclera: Conjunctivae normal.   Neck:      Thyroid: No thyromegaly.   Cardiovascular:      Rate and Rhythm: Normal rate and regular rhythm.   Pulmonary:      Effort: Pulmonary effort is normal.      Breath sounds: Normal breath sounds.   Abdominal:      General: Bowel sounds are normal. There is no distension.      Palpations: Abdomen is soft. There is no mass.      Tenderness: There is no abdominal tenderness. There is no guarding or rebound.      Hernia: No hernia is present.   Genitourinary:     General: Normal vulva.      Vagina: Normal.      Cervix: Lesion and cervical bleeding present.      Uterus: Normal.       Adnexa: Right adnexa normal and left adnexa normal.         Skin:     General: Skin is warm and dry.   Neurological:      Mental Status: She is alert and oriented to person, place, and time.   Psychiatric:         Behavior: Behavior normal.         Thought Content: Thought content normal.         Judgment: Judgment normal.       Colposcopy Procedure Note    Procedures          Indications: Most recent Pap smear showed:  normal pap + HPV    Prior cervical/vaginal disease: This is her first abnormal pap  Prior cervical treatment: no treatment.  Contraception: post menopausal status    Procedure Details   The risks and benefits of the procedure and Verbal informed consent obtained.  Pregnancy test: not done    Speculum placed in vagina and excellent visualization of cervix achieved, cervix swabbed x 3 with acetic acid solution and Lugol's solution     Findings:  Cervix: acetowhite lesion(s) noted at 1, 6  o'clock and polyp noted at 7  o'clock; cervix swabbed with Lugol's solution, endocervical curettage performed, cervical biopsies taken at 1, 6  o'clock, specimen labelled and sent to pathology, and hemostasis achieved with Monsel's  solution.  Vaginal inspection: normal without visible lesions.  Vulvar colposcopy: vulvar colposcopy not performed.  Colpo adequacy: was not adquate    Specimens: 1, 6, ECC    Colpo impression: TRACEY 1    Complications: none.   Patient tolerated procedure well.     Smoking Status: No                   Assessment/Plan  1) Normal pap + HPV- s/p colpo and bx. If pt has dysplasia, will rec CKC as this was her first pap in 20 years.   2)  VB- EL 0.2 cm s/p ablation. Large endocervical polyp noted, unable to remove in office. Plan HS D&C with MYOSURE.Risks, benefits and alternatives of the procedure were discussed, including , but not limited to: infection, bleeding, transfusion, injury to adjacent structures, laparotomy, possible nondiagnostic findings, possible findings of unexpected malignancy, reoperation, recurrent symptoms, thromboembolic events, anaeasthetic complications and death. Pre/intra/postop course was reviewed and all questions answered. Patient was encouraged to call for any additional questions she might have in the future.    3)MMG- schedule  4) DEXA- declines evaluation  5) Cscope- declines, plans Cologuard.  6)       Diagnoses and all orders for this visit:    1. Screening for colon cancer (Primary)  -     Cologuard - Stool, Per Rectum; Future    2. Essential (primary) hypertension    3. Encounter for screening mammogram for malignant neoplasm of breast  -     Mammo Screening Digital Tomosynthesis Bilateral With CAD; Future    4. Pap smear abnormality of cervix/human papillomavirus (HPV) positive  -     Reference Histopathology    5. Encounter to establish care with new doctor    6. Endocervical polyp        Christina Gonzalez MD  01/24/2024        15:53 EST        I called and s/w pt re: CIN1 on her biopsies.  We discussed a CKC at the time of her HS D&C MYOSURE and she is agreeable. R/B/A were discussed as well. I modified the case request, can you please let surgery know? Thanks aKR

## 2024-01-24 NOTE — PROGRESS NOTES
New GYN Exam    CC- Here for abnormal pap smear and  VB    Christy You is a 63 y.o. female new patient who presents for abnormal pap smear and  VB. She had an ablation at age 43 and took HRT for < 1 year, none now. She started having spotting about 6 months ago. She had not had a pap > 20 year and saw her primary MD and her pap was normal with + HPV ( neg for 16, 18, 45).  On exam, she has an endocervical polyp that is too large to remove in the office. She will need to have a HS D&C for removal of this lesion. We also did a colpo and if her path shows dysplasia, we will also need to do a CKC at the time of her D&C and she is agreeable. Her US today shows a 5.4 cm AV uterus with an EL= 0.2 cm. Her ovaries appear normal. Her US was compared to her scan on 2023. .     OB History          0    Para   0    Term   0            AB        Living             SAB        IAB        Ectopic        Molar        Multiple        Live Births              Obstetric Comments   infertility               Menarche:9  Menopause:43- endometrial ablation  HRT:took < 1 year, none now  Current contraception: post menopausal status  History of abnormal Pap smear: yes -  2023- nl pap + HPV  History of abnormal mammogram: no  Family history of uterine, colon or ovarian cancer: yes - mom uterine cancer (?)   Family history of breast cancer: no  STD's: none  Last pap smear:2023- normal pap + H_PV  Gardasil: missed  CHUCK: none      Health Maintenance   Topic Date Due    DXA SCAN  Never done    COVID-19 Vaccine (1) Never done    ZOSTER VACCINE (1 of 2) Never done    ANNUAL WELLNESS VISIT  Never done    RSV Vaccine - Adults (1 - 1-dose 60+ series) Never done    COLORECTAL CANCER SCREENING  2019    LIPID PANEL  10/08/2022    INFLUENZA VACCINE  Never done    Pneumococcal Vaccine 65+ (1 of 1 - PCV) Never done    BMI FOLLOWUP  2024    MAMMOGRAM  2026    TDAP/TD VACCINES (2 - Td or Tdap) 04/15/2026     PAP SMEAR  11/16/2026    HEPATITIS C SCREENING  Completed       Past Medical History:   Diagnosis Date    Abnormal Pap smear of cervix     Allergic     Year round    Anxiety     Arthritis     hands, knees and hips    Benign essential hypertension     Female infertility     Headache     silent migraines with the vision in one eye going white.    Hyperlipidemia     Hyperthyroidism     Hypothyroidism 2005    Hashimoto's Disease       Past Surgical History:   Procedure Laterality Date    CATARACT EXTRACTION      ENDOMETRIAL ABLATION  October 2001    JOINT REPLACEMENT  June 2012    REPLACEMENT TOTAL KNEE Left     THYROID SURGERY      THYROIDECTOMY      WISDOM TOOTH EXTRACTION           Current Outpatient Medications:     levothyroxine (SYNTHROID, LEVOTHROID) 175 MCG tablet, TAKE 1 TABLET BY MOUTH DAILY, Disp: 30 tablet, Rfl: 0    olmesartan-hydrochlorothiazide (BENICAR HCT) 40-25 MG per tablet, Take 1 tablet by mouth Daily., Disp: , Rfl:     Current Facility-Administered Medications:     aspirin chewable tablet 324 mg, 324 mg, Oral, Once, Epley, James, APRN    nitroglycerin (NITROSTAT) SL tablet 0.4 mg, 0.4 mg, Sublingual, Q5 Min PRN, Silvano Franco Jr., MD    sodium chloride 0.9 % flush 10 mL, 10 mL, Intravenous, PRN, Silvano Franco Jr., MD    Allergies   Allergen Reactions    Other Other (See Comments)     PATCH LEFT RED WHELP UNDER PATCH AFTER LEFT ON 1 WEEK.    Adhesive Tape Rash       Social History     Tobacco Use    Smoking status: Never     Passive exposure: Never    Smokeless tobacco: Never   Vaping Use    Vaping Use: Never used   Substance Use Topics    Alcohol use: Never     Comment: caffeine use- coffee    Drug use: No       Family History   Problem Relation Age of Onset    Hypertension Father     Other Father         leukopenia    Cancer Father         Leukemia    Uterine cancer Mother     Hypertension Mother     Cancer Mother         Endometrial    ALS Mother     Cancer Sister         Thyroid Cancer     "Coronary artery disease Paternal Grandmother     Heart disease Paternal Grandmother     Cancer Maternal Grandfather         Lung, life long smoker    Cancer Other         lung    Breast cancer Neg Hx     Ovarian cancer Neg Hx     Colon cancer Neg Hx     Deep vein thrombosis Neg Hx     Pulmonary embolism Neg Hx        Review of Systems   Constitutional:  Positive for activity change. Negative for appetite change, fatigue, fever and unexpected weight change.   Eyes:  Negative for photophobia and visual disturbance.   Respiratory:  Negative for cough and shortness of breath.    Cardiovascular:  Negative for chest pain and palpitations.   Gastrointestinal:  Negative for abdominal distention, abdominal pain, constipation, diarrhea and nausea.   Endocrine: Negative for cold intolerance and heat intolerance.   Genitourinary:  Positive for vaginal bleeding. Negative for dyspareunia, dysuria, menstrual problem, pelvic pain and vaginal discharge.   Musculoskeletal:  Negative for back pain.   Skin:  Negative for color change and rash.   Neurological:  Negative for headaches.   Hematological:  Negative for adenopathy. Does not bruise/bleed easily.   Psychiatric/Behavioral:  Negative for dysphoric mood. The patient is nervous/anxious.        /86   Ht 167.6 cm (66\")   Wt 88.8 kg (195 lb 12.8 oz)   BMI 31.60 kg/m²     Physical Exam  Vitals and nursing note reviewed. Exam conducted with a chaperone present.   Constitutional:       Appearance: Normal appearance. She is well-developed. She is obese.   HENT:      Head: Normocephalic and atraumatic.   Eyes:      General: No scleral icterus.     Conjunctiva/sclera: Conjunctivae normal.   Neck:      Thyroid: No thyromegaly.   Cardiovascular:      Rate and Rhythm: Normal rate and regular rhythm.   Pulmonary:      Effort: Pulmonary effort is normal.      Breath sounds: Normal breath sounds.   Abdominal:      General: Bowel sounds are normal. There is no distension.      " Palpations: Abdomen is soft. There is no mass.      Tenderness: There is no abdominal tenderness. There is no guarding or rebound.      Hernia: No hernia is present.   Genitourinary:     General: Normal vulva.      Vagina: Normal.      Cervix: Lesion and cervical bleeding present.      Uterus: Normal.       Adnexa: Right adnexa normal and left adnexa normal.         Skin:     General: Skin is warm and dry.   Neurological:      Mental Status: She is alert and oriented to person, place, and time.   Psychiatric:         Behavior: Behavior normal.         Thought Content: Thought content normal.         Judgment: Judgment normal.       Colposcopy Procedure Note    Procedures          Indications: Most recent Pap smear showed:  normal pap + HPV    Prior cervical/vaginal disease: This is her first abnormal pap  Prior cervical treatment: no treatment.  Contraception: post menopausal status    Procedure Details   The risks and benefits of the procedure and Verbal informed consent obtained.  Pregnancy test: not done    Speculum placed in vagina and excellent visualization of cervix achieved, cervix swabbed x 3 with acetic acid solution and Lugol's solution     Findings:  Cervix: acetowhite lesion(s) noted at 1, 6  o'clock and polyp noted at 7  o'clock; cervix swabbed with Lugol's solution, endocervical curettage performed, cervical biopsies taken at 1, 6  o'clock, specimen labelled and sent to pathology, and hemostasis achieved with Monsel's solution.  Vaginal inspection: normal without visible lesions.  Vulvar colposcopy: vulvar colposcopy not performed.  Colpo adequacy: was not adquate    Specimens: 1, 6, ECC    Colpo impression: TRACEY 1    Complications: none.   Patient tolerated procedure well.     Smoking Status: No                   Assessment/Plan  1) Normal pap + HPV- s/p colpo and bx. If pt has dysplasia, will rec CKC as this was her first pap in 20 years.   2)  VB- EL 0.2 cm s/p ablation. Large endocervical polyp  noted, unable to remove in office. Plan HS D&C with MYOSURE.Risks, benefits and alternatives of the procedure were discussed, including , but not limited to: infection, bleeding, transfusion, injury to adjacent structures, laparotomy, possible nondiagnostic findings, possible findings of unexpected malignancy, reoperation, recurrent symptoms, thromboembolic events, anaeasthetic complications and death. Pre/intra/postop course was reviewed and all questions answered. Patient was encouraged to call for any additional questions she might have in the future.    3)MMG- schedule  4) DEXA- declines evaluation  5) Cscope- declines, plans Cologuard.  6)       Diagnoses and all orders for this visit:    1. Screening for colon cancer (Primary)  -     Cologuard - Stool, Per Rectum; Future    2. Essential (primary) hypertension    3. Encounter for screening mammogram for malignant neoplasm of breast  -     Mammo Screening Digital Tomosynthesis Bilateral With CAD; Future    4. Pap smear abnormality of cervix/human papillomavirus (HPV) positive  -     Reference Histopathology    5. Encounter to establish care with new doctor    6. Endocervical polyp        Christina Gonzalez MD  01/24/2024        15:53 EST

## 2024-01-25 PROBLEM — B97.7 HPV IN FEMALE: Status: ACTIVE | Noted: 2024-01-24

## 2024-01-25 PROBLEM — N95.0 PMB (POSTMENOPAUSAL BLEEDING): Status: ACTIVE | Noted: 2024-01-24

## 2024-01-25 PROBLEM — N84.1 ENDOCERVICAL POLYP: Status: ACTIVE | Noted: 2024-01-24

## 2024-01-26 LAB
DX ICD CODE: NORMAL
DX ICD CODE: NORMAL
PATH REPORT.FINAL DX SPEC: NORMAL
PATH REPORT.GROSS SPEC: NORMAL
PATH REPORT.RELEVANT HX SPEC: NORMAL
PATH REPORT.SITE OF ORIGIN SPEC: NORMAL
PATHOLOGIST NAME: NORMAL
PAYMENT PROCEDURE: NORMAL

## 2024-02-01 NOTE — PROGRESS NOTES
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  We discussed a CKC at the time of her HS D&C MYOSURE and she is agreeable. R/B/A were discussed as well. I modified the case request, can you please let surgery know? Thanks aKR

## 2024-02-07 ENCOUNTER — HOSPITAL ENCOUNTER (OUTPATIENT)
Dept: MAMMOGRAPHY | Facility: HOSPITAL | Age: 64
Discharge: HOME | End: 2024-02-07
Admitting: OBSTETRICS & GYNECOLOGY
Payer: MEDICARE

## 2024-02-07 DIAGNOSIS — Z12.31 ENCOUNTER FOR SCREENING MAMMOGRAM FOR MALIGNANT NEOPLASM OF BREAST: ICD-10-CM

## 2024-02-07 PROCEDURE — 77063 BREAST TOMOSYNTHESIS BI: CPT

## 2024-02-07 PROCEDURE — 77067 SCR MAMMO BI INCL CAD: CPT

## 2024-02-09 RX ORDER — LEVOTHYROXINE SODIUM 175 UG/1
175 TABLET ORAL DAILY
Qty: 30 TABLET | Refills: 0 | Status: SHIPPED | OUTPATIENT
Start: 2024-02-09

## 2024-02-09 NOTE — TELEPHONE ENCOUNTER
Patient was to come and get her TSH done after I decreased her dose to 175 mcg last time-this was supposed to be done 6 weeks after her dose change and has not had it done yet-she needs to do that so that I can give her more refills-thanks

## 2024-02-09 NOTE — TELEPHONE ENCOUNTER
Rx Refill Note  Requested Prescriptions     Pending Prescriptions Disp Refills    levothyroxine (SYNTHROID, LEVOTHROID) 175 MCG tablet [Pharmacy Med Name: LEVOTHYROXINE 0.175MG (175MCG) TABS] 90 tablet 0     Sig: TAKE 1 TABLET BY MOUTH DAILY      Last office visit with prescribing clinician: 11/16/2023   Last telemedicine visit with prescribing clinician: Visit date not found   Next office visit with prescribing clinician: Visit date not found                         Would you like a call back once the refill request has been completed: [] Yes [] No    If the office needs to give you a call back, can they leave a voicemail: [] Yes [] No    Lj Lang MA  02/09/24, 13:50 EST

## 2024-02-15 ENCOUNTER — PRE-ADMISSION TESTING (OUTPATIENT)
Dept: PREADMISSION TESTING | Facility: HOSPITAL | Age: 64
End: 2024-02-15
Payer: MEDICARE

## 2024-02-15 VITALS
DIASTOLIC BLOOD PRESSURE: 76 MMHG | SYSTOLIC BLOOD PRESSURE: 132 MMHG | HEART RATE: 68 BPM | HEIGHT: 66 IN | WEIGHT: 195.1 LBS | BODY MASS INDEX: 31.36 KG/M2 | RESPIRATION RATE: 16 BRPM | OXYGEN SATURATION: 98 %

## 2024-02-15 LAB
ANION GAP SERPL CALCULATED.3IONS-SCNC: 10.2 MMOL/L (ref 5–15)
BUN SERPL-MCNC: 19 MG/DL (ref 8–23)
BUN/CREAT SERPL: 27.1 (ref 7–25)
CALCIUM SPEC-SCNC: 9.3 MG/DL (ref 8.6–10.5)
CHLORIDE SERPL-SCNC: 100 MMOL/L (ref 98–107)
CO2 SERPL-SCNC: 26.8 MMOL/L (ref 22–29)
CREAT SERPL-MCNC: 0.7 MG/DL (ref 0.57–1)
DEPRECATED RDW RBC AUTO: 43.2 FL (ref 37–54)
EGFRCR SERPLBLD CKD-EPI 2021: 96.1 ML/MIN/1.73
ERYTHROCYTE [DISTWIDTH] IN BLOOD BY AUTOMATED COUNT: 12.6 % (ref 12.3–15.4)
GLUCOSE SERPL-MCNC: 102 MG/DL (ref 65–99)
HCT VFR BLD AUTO: 43 % (ref 34–46.6)
HGB BLD-MCNC: 14.4 G/DL (ref 12–15.9)
MCH RBC QN AUTO: 30.7 PG (ref 26.6–33)
MCHC RBC AUTO-ENTMCNC: 33.5 G/DL (ref 31.5–35.7)
MCV RBC AUTO: 91.7 FL (ref 79–97)
PLATELET # BLD AUTO: 185 10*3/MM3 (ref 140–450)
PMV BLD AUTO: 9.8 FL (ref 6–12)
POTASSIUM SERPL-SCNC: 3.9 MMOL/L (ref 3.5–5.2)
QT INTERVAL: 419 MS
QTC INTERVAL: 450 MS
RBC # BLD AUTO: 4.69 10*6/MM3 (ref 3.77–5.28)
SODIUM SERPL-SCNC: 137 MMOL/L (ref 136–145)
WBC NRBC COR # BLD AUTO: 5.39 10*3/MM3 (ref 3.4–10.8)

## 2024-02-15 PROCEDURE — 84443 ASSAY THYROID STIM HORMONE: CPT | Performed by: NURSE PRACTITIONER

## 2024-02-15 PROCEDURE — 85027 COMPLETE CBC AUTOMATED: CPT | Performed by: OBSTETRICS & GYNECOLOGY

## 2024-02-15 PROCEDURE — 36415 COLL VENOUS BLD VENIPUNCTURE: CPT

## 2024-02-15 PROCEDURE — 93010 ELECTROCARDIOGRAM REPORT: CPT | Performed by: INTERNAL MEDICINE

## 2024-02-15 PROCEDURE — 80048 BASIC METABOLIC PNL TOTAL CA: CPT | Performed by: OBSTETRICS & GYNECOLOGY

## 2024-02-15 PROCEDURE — 93005 ELECTROCARDIOGRAM TRACING: CPT

## 2024-02-15 RX ORDER — LEVOTHYROXINE SODIUM 175 UG/1
175 TABLET ORAL DAILY
Qty: 90 TABLET | Refills: 1 | Status: SHIPPED | OUTPATIENT
Start: 2024-02-15

## 2024-02-15 NOTE — DISCHARGE INSTRUCTIONS
PRE-ADMISSION TESTING INSTRUCTIONS FOR ADULTS    Take these medications the morning of surgery with a small sip of water:  levothyroxine      Do not take any insulin or diabetes medications the morning of surgery.      No aspirin, advil, aleve, ibuprofen, naproxen, diet pills, decongestants, or herbal/vitamins for a week prior to surgery.       Tylenol/Acetaminophen is okay to take if needed.    General Instructions:    DO NOT EAT SOLID FOOD AFTER MIDNIGHT THE NIGHT BEFORE SURGERY. No gum, mints, or hard candy after midnight the night before surgery.  You may drink clear liquids the day of surgery up until 2 hours before your arrival time.  (7:00 am)  Clear liquids are liquids you can see through. Nothing RED in color.    Plain water    Sports drinks      Gelatin (Jell-O)  Fruit juices without pulp such as white grape juice and apple juice  Popsicles that contain no fruit or yogurt  Tea or coffee (no cream or milk added)    It is beneficial for you to have a clear drink that contains carbohydrates 2 hours before your arrival time.  We suggest a 20 ounce bottle of Gatorade or Powerade for non-diabetic patients or a 20 ounce bottle of Gatorade Zero or Powerade Zero for diabetic patients.   (7:00 am)    Patients who avoid smoking, chewing tobacco and alcohol for 4 weeks prior to surgery have a reduced risk of post-operative complications.  If at all possible, quit smoking as many days before surgery as you can.    Do not smoke, use chewing tobacco or drink alcohol the day of surgery    Bring your C-PAP/ BI-PAP machine if you use one.  Wear clean comfortable clothes.  Do not wear contact lenses, lotion, deodorant, or make-up.  Bring a case for your glasses if applicable. You may brush your teeth the morning of surgery.  You may wear dentures/partials, do not put adhesive/glue on them.  Leave all other jewelry and valuables at home.      Preventing a Surgical Site Infection:    Shower the night before and on the morning  of surgery using the chlorhexidine soap you were given.  Use a clean washcloth with the soap.  Place clean sheets on your bed after showering the night before surgery. Do not use the CHG soap on your hair, face, or private areas. Wash your body gently for five (5) minutes. Do not scrub your skin.  Dry with a clean towel and dress in clean clothing.  Do not shave the surgical area for 10 days-2 weeks prior to surgery  because the razor can irritate skin and make it easier to develop an infection.  Make sure you, your family, and all healthcare providers clean their hands with soap and water or an alcohol based hand  before caring for you or your wound.      Day of surgery:    Your surgeon’s office will advise you of your arrival time for the day of surgery.    Upon arrival, a Pre-op nurse and Anesthesia provider will review your health history, obtain vital signs, and answer questions you may have. The anesthesia provider will also discuss the type of anesthesia that will be needed for your procedure, which may include general anesthesia. The only belongings needed at this time will be your home medications and if applicable your C-PAP/BI-PAP machine.  If you are staying overnight your family can leave the rest of your belongings in the car and bring them to your room later.  A Pre-op nurse will start an IV and you may receive medication in preparation for surgery, including something to help you relax.  Your family will be able to see you in the Pre-op area.  While you are in surgery your family should notify the waiting room  if they leave the waiting room area and provide a contact phone number.    IF you have any questions, you can call the Pre-Admission Department at [phone] or your surgeon's office.  Notify your surgeon if  you become sick, have a fever, productive cough, or cannot be here the day of surgery    Please be aware that surgery does come with discomfort.  We want to make  every effort to control your discomfort so please discuss any uncontrolled symptoms with your nurse.   Your doctor will most likely have prescribed pain medications.      If you are going home after surgery, you will receive individualized written care instructions before being discharged.  A responsible adult (over the age of 18) must drive you to and from the hospital on the day of your surgery and stay with you for 24 hours after anesthesia.    If you are staying overnight following surgery, you will be transported to your hospital room following the recovery period.  [facility] has all private rooms.    You may receive a survey regarding the care you received. Your feedback is very important and will be used to collect the necessary data to help us to continue to provide excellent care.     Deductibles and co-payments are collected on the day of service. Please be prepared to pay the required co-pay, deductible or deposit on the day of service as defined by your plan.

## 2024-02-19 ENCOUNTER — ANESTHESIA EVENT (OUTPATIENT)
Dept: PERIOP | Facility: HOSPITAL | Age: 64
End: 2024-02-19
Payer: MEDICARE

## 2024-02-19 PROCEDURE — S0260 H&P FOR SURGERY: HCPCS | Performed by: OBSTETRICS & GYNECOLOGY

## 2024-02-20 ENCOUNTER — ANESTHESIA (OUTPATIENT)
Dept: PERIOP | Facility: HOSPITAL | Age: 64
End: 2024-02-20
Payer: MEDICARE

## 2024-02-20 ENCOUNTER — HOSPITAL ENCOUNTER (OUTPATIENT)
Facility: HOSPITAL | Age: 64
Setting detail: HOSPITAL OUTPATIENT SURGERY
Discharge: HOME | End: 2024-02-20
Attending: OBSTETRICS & GYNECOLOGY | Admitting: OBSTETRICS & GYNECOLOGY
Payer: MEDICARE

## 2024-02-20 VITALS
TEMPERATURE: 97.4 F | HEART RATE: 63 BPM | BODY MASS INDEX: 31.57 KG/M2 | OXYGEN SATURATION: 98 % | DIASTOLIC BLOOD PRESSURE: 88 MMHG | WEIGHT: 195.6 LBS | RESPIRATION RATE: 18 BRPM | SYSTOLIC BLOOD PRESSURE: 139 MMHG

## 2024-02-20 DIAGNOSIS — B97.7 HPV IN FEMALE: ICD-10-CM

## 2024-02-20 DIAGNOSIS — N95.0 PMB (POSTMENOPAUSAL BLEEDING): ICD-10-CM

## 2024-02-20 DIAGNOSIS — N84.1 ENDOCERVICAL POLYP: ICD-10-CM

## 2024-02-20 DIAGNOSIS — Z98.890 S/P CONE BIOPSY OF CERVIX: Primary | ICD-10-CM

## 2024-02-20 PROCEDURE — 25810000003 SODIUM CHLORIDE PER 500 ML: Performed by: OBSTETRICS & GYNECOLOGY

## 2024-02-20 PROCEDURE — 88307 TISSUE EXAM BY PATHOLOGIST: CPT | Performed by: OBSTETRICS & GYNECOLOGY

## 2024-02-20 PROCEDURE — 57520 CONIZATION OF CERVIX: CPT | Performed by: OBSTETRICS & GYNECOLOGY

## 2024-02-20 PROCEDURE — 25010000002 KETOROLAC TROMETHAMINE PER 15 MG: Performed by: ANESTHESIOLOGY

## 2024-02-20 PROCEDURE — 25010000002 ONDANSETRON PER 1 MG: Performed by: NURSE ANESTHETIST, CERTIFIED REGISTERED

## 2024-02-20 PROCEDURE — 88300 SURGICAL PATH GROSS: CPT | Performed by: OBSTETRICS & GYNECOLOGY

## 2024-02-20 PROCEDURE — 25010000002 FENTANYL CITRATE (PF) 50 MCG/ML SOLUTION: Performed by: ANESTHESIOLOGY

## 2024-02-20 PROCEDURE — 88305 TISSUE EXAM BY PATHOLOGIST: CPT | Performed by: OBSTETRICS & GYNECOLOGY

## 2024-02-20 PROCEDURE — 25010000002 CEFAZOLIN SODIUM-DEXTROSE 2-3 GM-%(50ML) RECONSTITUTED SOLUTION: Performed by: ANESTHESIOLOGY

## 2024-02-20 PROCEDURE — 25010000002 PROPOFOL 10 MG/ML EMULSION: Performed by: ANESTHESIOLOGY

## 2024-02-20 PROCEDURE — 25010000002 VASOPRESSIN 20 UNIT/ML SOLUTION: Performed by: OBSTETRICS & GYNECOLOGY

## 2024-02-20 PROCEDURE — C1782 MORCELLATOR: HCPCS | Performed by: OBSTETRICS & GYNECOLOGY

## 2024-02-20 PROCEDURE — 25010000002 DEXAMETHASONE PER 1 MG: Performed by: NURSE ANESTHETIST, CERTIFIED REGISTERED

## 2024-02-20 PROCEDURE — 25010000002 MIDAZOLAM PER 1MG: Performed by: NURSE ANESTHETIST, CERTIFIED REGISTERED

## 2024-02-20 PROCEDURE — 25810000003 LACTATED RINGERS PER 1000 ML: Performed by: NURSE ANESTHETIST, CERTIFIED REGISTERED

## 2024-02-20 RX ORDER — FENTANYL CITRATE 50 UG/ML
INJECTION, SOLUTION INTRAMUSCULAR; INTRAVENOUS AS NEEDED
Status: DISCONTINUED | OUTPATIENT
Start: 2024-02-20 | End: 2024-02-20 | Stop reason: SURG

## 2024-02-20 RX ORDER — ONDANSETRON 2 MG/ML
4 INJECTION INTRAMUSCULAR; INTRAVENOUS ONCE AS NEEDED
Status: COMPLETED | OUTPATIENT
Start: 2024-02-20 | End: 2024-02-20

## 2024-02-20 RX ORDER — LIDOCAINE HYDROCHLORIDE 20 MG/ML
INJECTION, SOLUTION INFILTRATION; PERINEURAL AS NEEDED
Status: DISCONTINUED | OUTPATIENT
Start: 2024-02-20 | End: 2024-02-20 | Stop reason: SURG

## 2024-02-20 RX ORDER — IBUPROFEN 600 MG/1
600 TABLET ORAL EVERY 8 HOURS PRN
Qty: 20 TABLET | Refills: 0 | Status: SHIPPED | OUTPATIENT
Start: 2024-02-20

## 2024-02-20 RX ORDER — SODIUM CHLORIDE, SODIUM LACTATE, POTASSIUM CHLORIDE, CALCIUM CHLORIDE 600; 310; 30; 20 MG/100ML; MG/100ML; MG/100ML; MG/100ML
9 INJECTION, SOLUTION INTRAVENOUS CONTINUOUS PRN
Status: DISCONTINUED | OUTPATIENT
Start: 2024-02-20 | End: 2024-02-20 | Stop reason: HOSPADM

## 2024-02-20 RX ORDER — SODIUM CHLORIDE 0.9 % (FLUSH) 0.9 %
10 SYRINGE (ML) INJECTION AS NEEDED
Status: DISCONTINUED | OUTPATIENT
Start: 2024-02-20 | End: 2024-02-20 | Stop reason: HOSPADM

## 2024-02-20 RX ORDER — CEFAZOLIN SODIUM 2 G/50ML
SOLUTION INTRAVENOUS AS NEEDED
Status: DISCONTINUED | OUTPATIENT
Start: 2024-02-20 | End: 2024-02-20 | Stop reason: SURG

## 2024-02-20 RX ORDER — DEXAMETHASONE SODIUM PHOSPHATE 4 MG/ML
4 INJECTION, SOLUTION INTRA-ARTICULAR; INTRALESIONAL; INTRAMUSCULAR; INTRAVENOUS; SOFT TISSUE ONCE AS NEEDED
Status: COMPLETED | OUTPATIENT
Start: 2024-02-20 | End: 2024-02-20

## 2024-02-20 RX ORDER — MIDAZOLAM HYDROCHLORIDE 2 MG/2ML
0.5 INJECTION, SOLUTION INTRAMUSCULAR; INTRAVENOUS
Status: DISCONTINUED | OUTPATIENT
Start: 2024-02-20 | End: 2024-02-20 | Stop reason: HOSPADM

## 2024-02-20 RX ORDER — SODIUM CHLORIDE 9 MG/ML
40 INJECTION, SOLUTION INTRAVENOUS AS NEEDED
Status: DISCONTINUED | OUTPATIENT
Start: 2024-02-20 | End: 2024-02-20 | Stop reason: HOSPADM

## 2024-02-20 RX ORDER — KETOROLAC TROMETHAMINE 30 MG/ML
INJECTION, SOLUTION INTRAMUSCULAR; INTRAVENOUS AS NEEDED
Status: DISCONTINUED | OUTPATIENT
Start: 2024-02-20 | End: 2024-02-20 | Stop reason: SURG

## 2024-02-20 RX ORDER — PYRANTEL PAMOATE 100 %
POWDER (GRAM) MISCELLANEOUS AS NEEDED
Status: DISCONTINUED | OUTPATIENT
Start: 2024-02-20 | End: 2024-02-20 | Stop reason: HOSPADM

## 2024-02-20 RX ORDER — ONDANSETRON 2 MG/ML
4 INJECTION INTRAMUSCULAR; INTRAVENOUS ONCE AS NEEDED
Status: DISCONTINUED | OUTPATIENT
Start: 2024-02-20 | End: 2024-02-20 | Stop reason: HOSPADM

## 2024-02-20 RX ORDER — FAMOTIDINE 10 MG/ML
20 INJECTION, SOLUTION INTRAVENOUS
Status: COMPLETED | OUTPATIENT
Start: 2024-02-20 | End: 2024-02-20

## 2024-02-20 RX ORDER — VASOPRESSIN 20 U/ML
INJECTION PARENTERAL AS NEEDED
Status: DISCONTINUED | OUTPATIENT
Start: 2024-02-20 | End: 2024-02-20 | Stop reason: HOSPADM

## 2024-02-20 RX ORDER — PROPOFOL 10 MG/ML
VIAL (ML) INTRAVENOUS CONTINUOUS PRN
Status: DISCONTINUED | OUTPATIENT
Start: 2024-02-20 | End: 2024-02-20 | Stop reason: SURG

## 2024-02-20 RX ORDER — OXYCODONE HYDROCHLORIDE AND ACETAMINOPHEN 5; 325 MG/1; MG/1
1 TABLET ORAL ONCE AS NEEDED
Status: COMPLETED | OUTPATIENT
Start: 2024-02-20 | End: 2024-02-20

## 2024-02-20 RX ORDER — SODIUM CHLORIDE 0.9 % (FLUSH) 0.9 %
10 SYRINGE (ML) INJECTION EVERY 12 HOURS SCHEDULED
Status: DISCONTINUED | OUTPATIENT
Start: 2024-02-20 | End: 2024-02-20 | Stop reason: HOSPADM

## 2024-02-20 RX ORDER — SODIUM CHLORIDE 9 MG/ML
INJECTION, SOLUTION INTRAVENOUS AS NEEDED
Status: DISCONTINUED | OUTPATIENT
Start: 2024-02-20 | End: 2024-02-20 | Stop reason: HOSPADM

## 2024-02-20 RX ORDER — SODIUM CHLORIDE, SODIUM LACTATE, POTASSIUM CHLORIDE, CALCIUM CHLORIDE 600; 310; 30; 20 MG/100ML; MG/100ML; MG/100ML; MG/100ML
100 INJECTION, SOLUTION INTRAVENOUS ONCE
Status: DISCONTINUED | OUTPATIENT
Start: 2024-02-20 | End: 2024-02-20 | Stop reason: HOSPADM

## 2024-02-20 RX ORDER — HYDROCODONE BITARTRATE AND ACETAMINOPHEN 5; 325 MG/1; MG/1
1 TABLET ORAL EVERY 6 HOURS PRN
Qty: 15 TABLET | Refills: 0 | Status: SHIPPED | OUTPATIENT
Start: 2024-02-20

## 2024-02-20 RX ORDER — FERRIC SUBSULFATE 0.21 G/G
LIQUID TOPICAL AS NEEDED
Status: DISCONTINUED | OUTPATIENT
Start: 2024-02-20 | End: 2024-02-20 | Stop reason: HOSPADM

## 2024-02-20 RX ORDER — MIDAZOLAM HYDROCHLORIDE 2 MG/2ML
1 INJECTION, SOLUTION INTRAMUSCULAR; INTRAVENOUS
Status: DISCONTINUED | OUTPATIENT
Start: 2024-02-20 | End: 2024-02-20 | Stop reason: HOSPADM

## 2024-02-20 RX ORDER — MAGNESIUM HYDROXIDE 1200 MG/15ML
LIQUID ORAL AS NEEDED
Status: DISCONTINUED | OUTPATIENT
Start: 2024-02-20 | End: 2024-02-20 | Stop reason: HOSPADM

## 2024-02-20 RX ADMIN — PROPOFOL 125 MCG/KG/MIN: 10 INJECTION, EMULSION INTRAVENOUS at 11:14

## 2024-02-20 RX ADMIN — ONDANSETRON 4 MG: 2 INJECTION INTRAMUSCULAR; INTRAVENOUS at 09:14

## 2024-02-20 RX ADMIN — CEFAZOLIN SODIUM 2 G: 2 SOLUTION INTRAVENOUS at 11:22

## 2024-02-20 RX ADMIN — FAMOTIDINE 20 MG: 10 INJECTION, SOLUTION INTRAVENOUS at 09:14

## 2024-02-20 RX ADMIN — SODIUM CHLORIDE, POTASSIUM CHLORIDE, SODIUM LACTATE AND CALCIUM CHLORIDE 9 ML/HR: 600; 310; 30; 20 INJECTION, SOLUTION INTRAVENOUS at 09:13

## 2024-02-20 RX ADMIN — FENTANYL CITRATE 25 MCG: 50 INJECTION, SOLUTION INTRAMUSCULAR; INTRAVENOUS at 11:30

## 2024-02-20 RX ADMIN — DEXAMETHASONE SODIUM PHOSPHATE 4 MG: 4 INJECTION, SOLUTION INTRAMUSCULAR; INTRAVENOUS at 09:15

## 2024-02-20 RX ADMIN — MIDAZOLAM HYDROCHLORIDE 0.5 MG: 1 INJECTION, SOLUTION INTRAMUSCULAR; INTRAVENOUS at 11:06

## 2024-02-20 RX ADMIN — LIDOCAINE HYDROCHLORIDE 80 MG: 20 INJECTION, SOLUTION INFILTRATION; PERINEURAL at 11:14

## 2024-02-20 RX ADMIN — PROPOFOL 20 MG: 10 INJECTION, EMULSION INTRAVENOUS at 11:31

## 2024-02-20 RX ADMIN — PROPOFOL 30 MG: 10 INJECTION, EMULSION INTRAVENOUS at 11:15

## 2024-02-20 RX ADMIN — OXYCODONE AND ACETAMINOPHEN 1 TABLET: 5; 325 TABLET ORAL at 12:11

## 2024-02-20 RX ADMIN — PROPOFOL 20 MG: 10 INJECTION, EMULSION INTRAVENOUS at 11:17

## 2024-02-20 RX ADMIN — KETOROLAC TROMETHAMINE 30 MG: 30 INJECTION, SOLUTION INTRAMUSCULAR; INTRAVENOUS at 11:47

## 2024-02-20 RX ADMIN — PROPOFOL 20 MG: 10 INJECTION, EMULSION INTRAVENOUS at 11:20

## 2024-02-20 RX ADMIN — FENTANYL CITRATE 25 MCG: 50 INJECTION, SOLUTION INTRAMUSCULAR; INTRAVENOUS at 11:12

## 2024-02-20 NOTE — OP NOTE
Subjective     Date of Service:  02/20/24  Time of Service:  11:51 EST    Surgical Staff: Surgeon(s) and Role:     * Christina Gonzalez MD - Primary   Additional Staff: none   Pre-operative diagnosis(es): Pre-Op Diagnosis Codes:     * PMB (postmenopausal bleeding) [N95.0]     * Endocervical polyp [N84.1]     * HPV in female [B97.7]     Post-operative diagnosis(es): Post-Op Diagnosis Codes:     * PMB (postmenopausal bleeding) [N95.0]     * Endocervical polyp [N84.1]     * HPV in female [B97.7]   Procedure(s): Procedure(s):  DILATATION AND CURETTAGE   CERVICAL CONIZATION     Antibiotics: cefazolin (Ancef) ordered on call to OR     Anesthesia: Type: Choice  ASA:  II     Objective      Operative findings: Cervical stenosis, ablated cavity  Endocervical polyp small, necrotic  Normal cervix     Specimens removed: ID Type Source Tests Collected by Time   A (Not marked as sent) :  Tissue Endometrial Curettings TISSUE PATHOLOGY EXAM Christina Gonzalez MD 2/20/2024 1131   B (Not marked as sent) : Cervical Cone : Vicryl at 1200 Tissue Cervix TISSUE PATHOLOGY EXAM Christina Gonzalez MD 2/20/2024 1140   C (Not marked as sent) :  Curettings Endocervix TISSUE PATHOLOGY EXAM Christina Gonzalez MD 2/20/2024 1143      Fluid Intake: 500mL   Output: Documented Output  Est. Blood Loss 15 mL  Urine Output 50 mL      I/O this shift:  In: 50 [IV Piggyback:50]  Out: 65 [Urine:50; Blood:15]     Blood products used: No   Drains: * No LDAs found *   Implant Information: Nothing was implanted during the procedure   Complications: None apparent   Intraoperative consult(s): none   Condition: stable   Disposition: to PACU and then admit to  home         Assessment & Plan     After informed consent was obtained, the patient was taken to the operating room where MAC anesthesia was administered without difficulty.  She was placed in yellowfin stirrups and prepped and draped in normal sterile fashion after normal bimanual  exam.  A bivalve speculum was placed in the vagina and the anterior lip of the cervix was grasped with a single-tooth tenaculum.  Her previously seen endocervical polyp was mostly resolved and a small area of necrotic tissue was seen , likely from the biopsy in the office. The cervix was then dilated and the cavity was inaccessible due to her previous ablation. Endometrial curettage was then performed, although this was mostly in the endocervix and lower uterine segment. The cervix was injected with dilute vasopressin.  The patient tolerated this well.  Stay sutures of 0 Vicryl were then placed at 3 and 9:00 and secured.  Lugol's was then placed on the cervix and the transformation zone  was not seen.  An 11 blade scalpel was then used to create a cone shaped defect and this was then amputated at the base and taken off the table intact to 12 with silk.  Endocervical curettings were then collected.  These were also sent for permanent section.  The cone bed was then cauterized with the rollerball.  The cone bed was hemostatic.  Monsel's was then placed in the cone bed and the stay sutures were cut with long ties.  All instruments were removed from the vagina.  The patient tolerated the procedure well and all counts were correct for the procedure.  The patient was extruded without difficulty and taken to recovery room in good condition.  Postop instructions and restrictions were reviewed the patient and family detail and all of her questions were answered.  The patient is to undergo pelvic rest for the next 2 weeks and follow-up in my office in 2 weeks for postop check.    Christina Gonzalez MD

## 2024-02-20 NOTE — INTERVAL H&P NOTE
H&P reviewed. The patient was examined and there are no changes to the H&P.  /75 (BP Location: Right arm, Patient Position: Lying)   Pulse 66   Temp 97.6 °F (36.4 °C) (Oral)   Resp 14   Wt 88.7 kg (195 lb 9.6 oz)   SpO2 100%   BMI 31.57 kg/m²   Procedure reviewed and all questions answered.   Christina Gonzalez MD

## 2024-02-20 NOTE — ANESTHESIA PREPROCEDURE EVALUATION
Anesthesia Evaluation     Patient summary reviewed and Nursing notes reviewed   history of anesthetic complications:  PONV  NPO Solid Status: > 8 hours  NPO Liquid Status: > 2 hours           Airway   Mallampati: II  TM distance: >3 FB  Neck ROM: full  No difficulty expected  Dental - normal exam     Pulmonary - negative pulmonary ROS and normal exam    breath sounds clear to auscultation  Cardiovascular - normal exam  Exercise tolerance: good (4-7 METS)    Rhythm: regular  Rate: normal    (+) hypertension well controlled less than 2 medications, hyperlipidemia      Neuro/Psych  (+) headaches  (-) psychiatric history  GI/Hepatic/Renal/Endo    (+) obesity, thyroid problem hypothyroidism    Musculoskeletal     (+) back pain, chronic pain  Abdominal   (+) obese   Substance History - negative use     OB/GYN negative ob/gyn ROS         Other   arthritis,                 Anesthesia Plan    ASA 2     MAC     intravenous induction     Anesthetic plan, risks, benefits, and alternatives have been provided, discussed and informed consent has been obtained with: patient.  Pre-procedure education provided  Use of blood products discussed with patient  Consented to blood products.      CODE STATUS:

## 2024-02-20 NOTE — ANESTHESIA POSTPROCEDURE EVALUATION
Patient: Christy You    Procedure Summary       Date: 02/20/24 Room / Location: [facility] OR 1 /  [facility] OR    Anesthesia Start: 1110 Anesthesia Stop: 1155    Procedures:       DILATATION AND CURETTAGE HYSTEROSCOPY with MYOSURE (Vagina)      Cold Knife Conization (Cervix) Diagnosis:       PMB (postmenopausal bleeding)      Endocervical polyp      HPV in female      (PMB (postmenopausal bleeding) [N95.0])      (Endocervical polyp [N84.1])      (HPV in female [B97.7])    Surgeons: Christina Gonzalez MD Provider: Maryanne Ordonez MD    Anesthesia Type: MAC ASA Status: 2            Anesthesia Type: MAC    Vitals  Vitals Value Taken Time   /88 02/20/24 1240   Temp 97.4 °F (36.3 °C) 02/20/24 1157   Pulse 63 02/20/24 1240   Resp 18 02/20/24 1240   SpO2 98 % 02/20/24 1240           Post Anesthesia Care and Evaluation    Patient location during evaluation: PHASE II  Patient participation: complete - patient participated  Level of consciousness: awake and alert  Pain score: 0  Pain management: satisfactory to patient    Airway patency: patent  Anesthetic complications: No anesthetic complications  PONV Status: none  Cardiovascular status: acceptable  Respiratory status: acceptable  Hydration status: acceptable

## 2024-02-20 NOTE — H&P
Patient Care Team:  Jaelyn Douglas APRN as PCP - General (Nurse Practitioner)  Christina Gonzalez MD as Consulting Physician (Obstetrics and Gynecology)    Chief complaint  VB and TRACEY 1 on bx       HPI:     Christy You is a 63 y.o. female est pt who presents for CKC and HS H&D with MYOSURE for  VB and TRACEY 1 on bx. . She had an ablation at age 43 and took HRT for < 1 year, none now. She started having spotting about 6 months ago. She had not had a pap > 20 year and saw her primary MD and her pap was normal with + HPV ( neg for 16, 18, 45).  On exam, she has an endocervical polyp that is too large to remove in the office. She will need to have a HS D&C for removal of this lesion. We also did a colpo and her path shows dysplasia with TRACEY 1 so, we will also need to do a CKC at the time of her D&C and she is agreeable. Her US shows a 5.4 cm AV uterus with an EL= 0.2 cm. Her ovaries appear normal. Her US was compared to her scan on 12/19/2023. .        PMH:   Past Medical History:   Diagnosis Date    Abnormal Pap smear of cervix     Allergic     Year round    Anxiety     Arthritis     hands, knees and hips    Benign essential hypertension     Female infertility     Headache     silent migraines with the vision in one eye going white.    Hyperlipidemia     Hyperthyroidism     h/o thyroidectomy    Hypothyroidism 2005    secondary to thyroid removal    PONV (postoperative nausea and vomiting)          PSH:   Past Surgical History:   Procedure Laterality Date    CATARACT EXTRACTION      ENDOMETRIAL ABLATION  October 2001    JOINT REPLACEMENT  June 2012    REPLACEMENT TOTAL KNEE Left     THYROIDECTOMY      WISDOM TOOTH EXTRACTION         SoHx:   Social History     Socioeconomic History    Marital status: Single   Tobacco Use    Smoking status: Never     Passive exposure: Never    Smokeless tobacco: Never   Vaping Use    Vaping Use: Never used   Substance and Sexual Activity    Alcohol use: Never      Comment: caffeine use- coffee    Drug use: No    Sexual activity: Not Currently     Partners: Male     Birth control/protection: Post-menopausal       FHx:   Family History   Problem Relation Age of Onset    Uterine cancer Mother     Hypertension Mother     Cancer Mother         Endometrial    ALS Mother     Hypertension Father     Other Father         leukopenia    Cancer Father         Leukemia    Cancer Sister         Thyroid Cancer    Cancer Maternal Grandfather         Lung, life long smoker    Coronary artery disease Paternal Grandmother     Heart disease Paternal Grandmother     Cancer Other         lung    Breast cancer Neg Hx     Ovarian cancer Neg Hx     Colon cancer Neg Hx     Deep vein thrombosis Neg Hx     Pulmonary embolism Neg Hx     Malig Hyperthermia Neg Hx      OB History            0    Para   0    Term   0            AB        Living               SAB        IAB        Ectopic        Molar        Multiple        Live Births               Obstetric Comments   infertility                   Menarche:9  Menopause:43- endometrial ablation  HRT:took < 1 year, none now  Current contraception: post menopausal status  History of abnormal Pap smear: yes -  2023- nl pap + HPV  History of abnormal mammogram: no  Family history of uterine, colon or ovarian cancer: yes - mom uterine cancer (?)   Family history of breast cancer: no  STD's: none  Last pap smear:2023- normal pap + HPV, TRACEY 1 on bx  Gardasil: missed  CHUCK: none           Allergies: Other and Adhesive tape    Medications:   No current facility-administered medications on file prior to encounter.     Current Outpatient Medications on File Prior to Encounter   Medication Sig Dispense Refill    olmesartan-hydrochlorothiazide (BENICAR HCT) 40-25 MG per tablet Take 0.5 tablets by mouth Daily.         There were no vitals taken for this visit.    Review of Systems   Constitutional:  Positive for activity change. Negative for appetite  change, fatigue, fever and unexpected weight change.   Eyes:  Negative for photophobia and visual disturbance.   Respiratory:  Negative for cough and shortness of breath.    Cardiovascular:  Negative for chest pain and palpitations.   Gastrointestinal:  Negative for abdominal distention, abdominal pain, constipation, diarrhea and nausea.   Endocrine: Negative for cold intolerance and heat intolerance.   Genitourinary:  Positive for vaginal bleeding. Negative for dyspareunia, dysuria, menstrual problem, pelvic pain and vaginal discharge.   Musculoskeletal:  Negative for back pain.   Skin:  Negative for color change and rash.   Neurological:  Negative for headaches.   Hematological:  Negative for adenopathy. Does not bruise/bleed easily.   Psychiatric/Behavioral:  Negative for dysphoric mood. The patient is nervous/anxious.        Physical Exam  Vitals and nursing note reviewed. Exam conducted with a chaperone present.   Constitutional:       Appearance: Normal appearance. She is well-developed. She is obese.   HENT:      Head: Normocephalic and atraumatic.   Eyes:      General: No scleral icterus.     Conjunctiva/sclera: Conjunctivae normal.   Neck:      Thyroid: No thyromegaly.   Cardiovascular:      Rate and Rhythm: Normal rate and regular rhythm.   Pulmonary:      Effort: Pulmonary effort is normal.      Breath sounds: Normal breath sounds.   Abdominal:      General: Bowel sounds are normal. There is no distension.      Palpations: Abdomen is soft. There is no mass.      Tenderness: There is no abdominal tenderness. There is no guarding or rebound.      Hernia: No hernia is present.   Genitourinary:     General: Normal vulva.      Vagina: Normal.      Cervix: Lesion and cervical bleeding present.      Uterus: Normal.       Adnexa: Right adnexa normal and left adnexa normal.         Skin:     General: Skin is warm and dry.   Neurological:      Mental Status: She is alert and oriented to person, place, and time.    Psychiatric:         Behavior: Behavior normal.         Thought Content: Thought content normal.         Judgment: Judgment normal.         Debilities/Disabilities Identified: None    Labs:     Lab Results (last 7 days)       ** No results found for the last 168 hours. **            Assessment/Plan:   1) Normal pap + HPV-  TRACEY 1, proceed with  CKC as this was her first pap in 20 years.   2)  VB- EL 0.2 cm s/p ablation. Large endocervical polyp noted, unable to remove in office. Plan HS D&C with MYOSURE.Risks, benefits and alternatives of the procedure were discussed, including , but not limited to: infection, bleeding, transfusion, injury to adjacent structures, laparotomy, possible nondiagnostic findings, possible findings of unexpected malignancy, reoperation, recurrent symptoms, thromboembolic events, anaeasthetic complications and death. Pre/intra/postop course was reviewed and all questions answered.      I discussed the patients findings and my recommendations with patient.     Christina Gonzalez MD  02/19/24  19:04 EST

## 2024-02-22 LAB
LAB AP CASE REPORT: NORMAL
PATH REPORT.FINAL DX SPEC: NORMAL
PATH REPORT.GROSS SPEC: NORMAL

## 2024-03-13 ENCOUNTER — OFFICE VISIT (OUTPATIENT)
Dept: OBSTETRICS AND GYNECOLOGY | Facility: CLINIC | Age: 64
End: 2024-03-13
Payer: MEDICARE

## 2024-03-13 VITALS
SYSTOLIC BLOOD PRESSURE: 150 MMHG | BODY MASS INDEX: 31.43 KG/M2 | WEIGHT: 195.6 LBS | DIASTOLIC BLOOD PRESSURE: 102 MMHG | HEIGHT: 66 IN

## 2024-03-13 DIAGNOSIS — N87.0 DYSPLASIA OF CERVIX, LOW GRADE (CIN 1): ICD-10-CM

## 2024-03-13 DIAGNOSIS — Z09 POSTOPERATIVE FOLLOW-UP: Primary | ICD-10-CM

## 2024-03-13 NOTE — PROGRESS NOTES
"Surgical follow up visit     Christy You is a 63 y.o. female who presents to the clinic 2 weeks status post D&C and Cold Knife Cone for   VB and TRACEY 1 on bx  Eating a regular diet without difficulty. Bowel movements are normal. The patient is not having any pain.. She did well postop.  Vaginal bleeding is occ spotting with wiping. Her cavity was ablated and her polyp was necrotic. Her path shows TRACEY 1 with neg margins and she will need a repeat pap in 6 months. .    The following portions of the patient's history were reviewed and updated as appropriate: allergies, current medications, past family history, past medical history, past social history, past surgical history, and problem list.    Review of Systems  Review of Systems   Constitutional:  Positive for activity change.   Gastrointestinal:  Negative for abdominal pain.   Genitourinary:  Positive for vaginal bleeding (occ spotting). Negative for pelvic pain, vaginal discharge and vaginal pain.        Objective    BP (!) 150/102   Ht 167.6 cm (66\")   Wt 88.7 kg (195 lb 9.6 oz)   BMI 31.57 kg/m²     Physical Exam  Vitals and nursing note reviewed. Exam conducted with a chaperone present.   Constitutional:       Appearance: Normal appearance. She is well-developed.   HENT:      Head: Normocephalic and atraumatic.   Eyes:      General: No scleral icterus.     Conjunctiva/sclera: Conjunctivae normal.   Neck:      Thyroid: No thyromegaly.   Abdominal:      General: There is no distension.      Palpations: Abdomen is soft. There is no mass.      Tenderness: There is no abdominal tenderness. There is no guarding or rebound.      Hernia: No hernia is present.   Genitourinary:     General: Normal vulva.      Vagina: Normal.      Cervix: Discharge and friability present.      Uterus: Normal.       Comments: Cone bed healing well  Suture material still seen  Skin:     General: Skin is warm and dry.   Neurological:      Mental Status: She is alert and oriented to " person, place, and time.   Psychiatric:         Behavior: Behavior normal.         Thought Content: Thought content normal.         Judgment: Judgment normal.         Assessment     1) Post op D&C, Hysteroscopy, and CKC - Doing well postoperatively.pt to report any spotting after the next 4 weeks.   2) Operative findings again reviewed. Pathology report discussed.  Intraoperative photos were not reviewed.      Plan    1. Continue any current medications.  2. Wound care discussed.  3. Activity restrictions: none  4. Anticipated return to work: now.  5. Follow up: 6 month(s) for repeat pap/HPV.   6.EPIC LOS calculator used to determine coding level.       Christina Gonzalez MD     03/13/2024     13:11 EDT

## 2024-09-18 ENCOUNTER — OFFICE VISIT (OUTPATIENT)
Dept: OBSTETRICS AND GYNECOLOGY | Facility: CLINIC | Age: 64
End: 2024-09-18
Payer: MEDICARE

## 2024-09-18 VITALS
WEIGHT: 194.4 LBS | DIASTOLIC BLOOD PRESSURE: 82 MMHG | HEIGHT: 66 IN | SYSTOLIC BLOOD PRESSURE: 122 MMHG | BODY MASS INDEX: 31.24 KG/M2

## 2024-09-18 DIAGNOSIS — Z12.31 ENCOUNTER FOR SCREENING MAMMOGRAM FOR MALIGNANT NEOPLASM OF BREAST: ICD-10-CM

## 2024-09-18 DIAGNOSIS — N87.0 DYSPLASIA OF CERVIX, LOW GRADE (CIN 1): Primary | ICD-10-CM

## 2024-09-18 DIAGNOSIS — N84.0 ENDOMETRIAL POLYP: ICD-10-CM

## 2024-09-19 RX ORDER — LEVOTHYROXINE SODIUM 175 UG/1
175 TABLET ORAL DAILY
Qty: 90 TABLET | Refills: 0 | Status: SHIPPED | OUTPATIENT
Start: 2024-09-19

## 2024-09-23 LAB
CYTOLOGIST CVX/VAG CYTO: ABNORMAL
CYTOLOGY CVX/VAG DOC CYTO: ABNORMAL
CYTOLOGY CVX/VAG DOC THIN PREP: ABNORMAL
DX ICD CODE: ABNORMAL
DX ICD CODE: ABNORMAL
Lab: ABNORMAL
OTHER STN SPEC: ABNORMAL
PATHOLOGIST CVX/VAG CYTO: ABNORMAL
RECOM F/U CVX/VAG CYTO: ABNORMAL
STAT OF ADQ CVX/VAG CYTO-IMP: ABNORMAL

## 2024-09-24 LAB
HPV I/H RISK 4 DNA CVX QL PROBE+SIG AMP: NEGATIVE
WRITTEN AUTHORIZATION: NORMAL

## 2024-12-22 ENCOUNTER — READMISSION MANAGEMENT (OUTPATIENT)
Dept: CALL CENTER | Facility: HOSPITAL | Age: 64
End: 2024-12-22
Payer: MEDICARE

## 2024-12-23 ENCOUNTER — TRANSITIONAL CARE MANAGEMENT TELEPHONE ENCOUNTER (OUTPATIENT)
Dept: CALL CENTER | Facility: HOSPITAL | Age: 64
End: 2024-12-23
Payer: MEDICARE

## 2024-12-23 NOTE — OUTREACH NOTE
Call Center TCM Note      Flowsheet Row Responses   [facility] patient discharged from? Non-  [[place]]   Does the patient have one of the following disease processes/diagnoses(primary or secondary)? Other   TCM attempt successful? Yes   Call end time 1451   Discharge diagnosis Acute pancreatitis,   Person spoke with today (if not patient) and relationship pt   Comments pt wants to make it her welness medicare appt please advise   Does the patient have an appointment with their PCP within 7-14 days of discharge? No appointments available   Nursing Interventions Routed TCM call to PCP office   Psychosocial issues? No   Did the patient receive a copy of their discharge instructions? Yes   Nursing interventions Reviewed instructions with patient   What is the patient's perception of their health status since discharge? Improving   Is the patient/caregiver able to teach back signs and symptoms related to disease process for when to call PCP? Yes   Is the patient/caregiver able to teach back signs and symptoms related to disease process for when to call 911? Yes   Is the patient/caregiver able to teach back the hierarchy of who to call/visit for symptoms/problems? PCP, Specialist, Home health nurse, Urgent Care, ED, 911 Yes   Additional teach back comments 01/03- gallbladder surgey   TCM call completed? Yes   Wrap up additional comments pt reports doing wrll no concerns or questions noted   Call end time 1451   Would this patient benefit from a Referral to Amb Social Work? No   Is the patient interested in additional calls from an ambulatory ? No            Cate Grajeda RN    12/23/2024, 14:53 EST

## 2024-12-23 NOTE — OUTREACH NOTE
Prep Survey      Flowsheet Row Responses   [facility] facility patient discharged from? Non-BH   Is LACE score < 7 ? Non-BH Discharge   Eligibility [facility]   Date of Admission 12/17/24   Date of Discharge 12/22/24   Discharge Disposition Home or Self Care   Discharge diagnosis Acute pancreatitis,   Does the patient have one of the following disease processes/diagnoses(primary or secondary)? Other   Does the patient have Home health ordered? No   Prep survey completed? Yes            Марина Jaquez Registered Nurse

## 2024-12-26 ENCOUNTER — TELEPHONE (OUTPATIENT)
Dept: FAMILY MEDICINE CLINIC | Facility: CLINIC | Age: 64
End: 2024-12-26
Payer: MEDICARE

## 2024-12-26 ENCOUNTER — DOCUMENTATION (OUTPATIENT)
Dept: FAMILY MEDICINE CLINIC | Facility: CLINIC | Age: 64
End: 2024-12-26
Payer: MEDICARE

## 2024-12-26 NOTE — TELEPHONE ENCOUNTER
Pt called through hub sx swelling below knees and in ankle - adv she went to ER w/ appendicitis and was concerned this may be side effects she spoke w/ Munira about her concerns and wants and alena for mon

## 2024-12-26 NOTE — PROGRESS NOTES
PT called about swelling in her legs. Advised provider is out of the office until next week. PT will monitor. Spoke with Yomaira to call PT to schedule for appt.

## 2024-12-27 RX ORDER — LEVOTHYROXINE SODIUM 175 UG/1
175 TABLET ORAL DAILY
Qty: 90 TABLET | Refills: 0 | Status: SHIPPED | OUTPATIENT
Start: 2024-12-27

## 2024-12-27 NOTE — TELEPHONE ENCOUNTER
Rx Refill Note  Requested Prescriptions     Pending Prescriptions Disp Refills    levothyroxine (SYNTHROID, LEVOTHROID) 175 MCG tablet [Pharmacy Med Name: LEVOTHYROXINE 0.175MG (175MCG) TABS] 90 tablet 1     Sig: TAKE 1 TABLET BY MOUTH DAILY      Last office visit with prescribing clinician: 11/16/2023   Last telemedicine visit with prescribing clinician: Visit date not found   Next office visit with prescribing clinician: 12/30/2024                         Would you like a call back once the refill request has been completed: [] Yes [] No    If the office needs to give you a call back, can they leave a voicemail: [] Yes [] No    Lj Lang MA  12/27/24, 08:15 EST

## 2024-12-30 ENCOUNTER — OFFICE VISIT (OUTPATIENT)
Dept: FAMILY MEDICINE CLINIC | Facility: CLINIC | Age: 64
End: 2024-12-30
Payer: MEDICARE

## 2024-12-30 VITALS
OXYGEN SATURATION: 99 % | HEIGHT: 66 IN | TEMPERATURE: 96.4 F | BODY MASS INDEX: 32.27 KG/M2 | DIASTOLIC BLOOD PRESSURE: 82 MMHG | SYSTOLIC BLOOD PRESSURE: 134 MMHG | HEART RATE: 65 BPM | WEIGHT: 200.8 LBS

## 2024-12-30 DIAGNOSIS — I10 BENIGN ESSENTIAL HTN: ICD-10-CM

## 2024-12-30 DIAGNOSIS — Z00.00 ENCOUNTER FOR ANNUAL WELLNESS VISIT (AWV) IN MEDICARE PATIENT: Primary | ICD-10-CM

## 2024-12-30 DIAGNOSIS — E55.9 VITAMIN D DEFICIENCY: ICD-10-CM

## 2024-12-30 DIAGNOSIS — E03.9 ADULT HYPOTHYROIDISM: ICD-10-CM

## 2024-12-30 DIAGNOSIS — E78.2 MIXED HYPERLIPIDEMIA: ICD-10-CM

## 2024-12-30 PROCEDURE — 1160F RVW MEDS BY RX/DR IN RCRD: CPT | Performed by: NURSE PRACTITIONER

## 2024-12-30 PROCEDURE — 1159F MED LIST DOCD IN RCRD: CPT | Performed by: NURSE PRACTITIONER

## 2024-12-30 PROCEDURE — 3075F SYST BP GE 130 - 139MM HG: CPT | Performed by: NURSE PRACTITIONER

## 2024-12-30 PROCEDURE — G0439 PPPS, SUBSEQ VISIT: HCPCS | Performed by: NURSE PRACTITIONER

## 2024-12-30 PROCEDURE — 3079F DIAST BP 80-89 MM HG: CPT | Performed by: NURSE PRACTITIONER

## 2024-12-30 PROCEDURE — 1126F AMNT PAIN NOTED NONE PRSNT: CPT | Performed by: NURSE PRACTITIONER

## 2024-12-30 NOTE — PROGRESS NOTES
The ABCs of the Annual Wellness Visit  Subsequent Medicare Wellness Visit    Subjective    Christy You is a 64 y.o. female who presents for a Subsequent Medicare Wellness Visit.    The following portions of the patient's history were reviewed and   updated as appropriate: allergies, current medications, past family history, past medical history, past social history, past surgical history, and problem list.    Compared to one year ago, the patient feels her physical   health is the same.    Compared to one year ago, the patient feels her mental   health is the same.    Recent Hospitalizations:  She was admitted within the past 365 days at [facility].       Current Medical Providers:  Patient Care Team:  Jaelyn Douglas APRN as PCP - General (Nurse Practitioner)  Christina Gonzalez MD as Consulting Physician (Obstetrics and Gynecology)    Outpatient Medications Prior to Visit   Medication Sig Dispense Refill    levothyroxine (SYNTHROID, LEVOTHROID) 175 MCG tablet TAKE 1 TABLET BY MOUTH DAILY 90 tablet 0    olmesartan-hydrochlorothiazide (BENICAR HCT) 40-25 MG per tablet Take 0.5 tablets by mouth Daily.       No facility-administered medications prior to visit.       No opioid medication identified on active medication list. I have reviewed chart for other potential  high risk medication/s and harmful drug interactions in the elderly.        Aspirin is not on active medication list.  Aspirin use is not indicated based on review of current medical condition/s. Risk of harm outweighs potential benefits.  .    Patient Active Problem List   Diagnosis    Benign essential HTN    Adult hypothyroidism    Chronic fatigue    Anxiety    Acute blood loss anemia    Primary localized osteoarthrosis, lower leg    Mixed hyperlipidemia    Statin medication declined by patient    PMB (postmenopausal bleeding)    Endocervical polyp    HPV in female     Advance Care Planning   Advance Care Planning     Advance Directive  "is not on file.  ACP discussion was held with the patient during this visit. Patient does not have an advance directive, information provided.     Objective    Vitals:    24 1450   BP: 134/82   BP Location: Left arm   Patient Position: Sitting   Cuff Size: Adult   Pulse: 65   Temp: 96.4 °F (35.8 °C)   TempSrc: Temporal   SpO2: 99%   Weight: 91.1 kg (200 lb 12.8 oz)   Height: 167.6 cm (66\")   PainSc: 0-No pain     Estimated body mass index is 32.41 kg/m² as calculated from the following:    Height as of this encounter: 167.6 cm (66\").    Weight as of this encounter: 91.1 kg (200 lb 12.8 oz).    BMI is >= 30 and <35. (Class 1 Obesity). The following options were offered after discussion;: information on healthy weight added to patient's after visit summary       Does the patient have evidence of cognitive impairment? no          HEALTH RISK ASSESSMENT    Smoking Status:  Social History     Tobacco Use   Smoking Status Never    Passive exposure: Never   Smokeless Tobacco Never     Alcohol Consumption:  Social History     Substance and Sexual Activity   Alcohol Use Never    Comment: caffeine use- coffee     Fall Risk Screen:    STEADI Fall Risk Assessment has not been completed.    Depression Screenin/30/2024     3:00 PM   PHQ-2/PHQ-9 Depression Screening   Little interest or pleasure in doing things Not at all   Feeling down, depressed, or hopeless Not at all   Trouble falling or staying asleep, or sleeping too much Not at all   Feeling tired or having little energy Not at all   Poor appetite or overeating Not at all   Feeling bad about yourself - or that you are a failure or have let yourself or your family down Not at all   Trouble concentrating on things, such as reading the newspaper or watching television Not at all   Moving or speaking so slowly that other people could have noticed? Or the opposite - being so fidgety or restless that you have been moving around a lot more than usual. Not at all "   Thoughts that you would be better off dead or hurting yourself in some way Not at all   Patient Health Questionnaire-9 Score 0       Health Habits and Functional and Cognitive Screenin/30/2024     3:00 PM   Functional & Cognitive Status   Do you have difficulty preparing food and eating? No   Do you have difficulty bathing yourself, getting dressed or grooming yourself? No   Do you have difficulty using the toilet? No   Do you have difficulty moving around from place to place? No   Do you have trouble with steps or getting out of a bed or a chair? No   Current Diet Low Carb Diet   Dental Exam Not up to date   Eye Exam Not up to date   Exercise (times per week) 7 times per week   Current Exercises Include Walking   Do you need help using the phone?  No   Are you deaf or do you have serious difficulty hearing?  No   Do you need help to go to places out of walking distance? No   Do you need help shopping? No   Do you need help preparing meals?  No   Do you need help with housework?  No   Do you need help with laundry? No   Do you need help taking your medications? No   Do you need help managing money? No   Do you ever drive or ride in a car without wearing a seat belt? No   Have you felt unusual stress, anger or loneliness in the last month? No   Who do you live with? Alone   If you need help, do you have trouble finding someone available to you? No   Have you been bothered in the last four weeks by sexual problems? No   Do you have difficulty concentrating, remembering or making decisions? No       Age-appropriate Screening Schedule:  Refer to the list below for future screening recommendations based on patient's age, sex and/or medical conditions. Orders for these recommended tests are listed in the plan section. The patient has been provided with a written plan.    Health Maintenance   Topic Date Due    DXA SCAN  Never done    LIPID PANEL  10/08/2022    Pneumococcal Vaccine 65+ (1 of 1 - PCV) Never done     BMI FOLLOWUP  05/22/2024    COVID-19 Vaccine (1 - 2024-25 season) Never done    INFLUENZA VACCINE  03/31/2025 (Originally 7/1/2024)    ANNUAL WELLNESS VISIT  12/30/2025    MAMMOGRAM  02/07/2026    TDAP/TD VACCINES (2 - Td or Tdap) 04/15/2026    COLORECTAL CANCER SCREENING  02/14/2027    PAP SMEAR  09/18/2027    HEPATITIS C SCREENING  Completed    ZOSTER VACCINE  Discontinued                  CMS Preventative Services Quick Reference  Risk Factors Identified During Encounter  None Identified  The above risks/problems have been discussed with the patient.  Pertinent information has been shared with the patient in the After Visit Summary.  An After Visit Summary and PPPS were made available to the patient.    Follow Up:   Next Medicare Wellness visit to be scheduled in 1 year.       Additional E&M Note during same encounter follows:  Patient has multiple medical problems which are significant and separately identifiable that require additional work above and beyond the Medicare Wellness Visit.      Chief Complaint  Leg Swelling (Started after being in ER for pancreatitis on 12/22/24. Has gone back down to normal) and Medicare Wellness-subsequent    Subjective        HPI  Christy You is also being seen today for AWV only    History of Present Illness  The patient presents for a Medicare wellness visit.    She experienced an episode of pancreatitis due to a gallstone obstructing the bile duct, which was slightly dilated. A 2.7 cm mass was also identified, likely representing a large stone. She has an upcoming appointment with a surgeon to discuss potential gallbladder removal. After her hospital stay, she experienced significant swelling b/l LE, which has since subsided. She was prescribed pain medication and additional blood pressure medication due to elevated blood pressure levels, attributed to her pain. Her blood pressure was 120/80 at Dr. Gonzalez's office.     She discontinued the pain medication two days  "post-discharge and transitioned to Advil, which she has since stopped. She reports no current pain but continues to experience watery diarrhea and has reduced her food intake accordingly. She reports no nausea since her discharge and has not required the prescribed nausea medication. She reports no shortness of breath, chest pain, heart palpitations, unusual fatigue, changes in stool characteristics, ear or throat issues, difficulty swallowing, acid reflux, urinary issues, blood in stool or urine, abnormal vaginal bleeding or discharge, muscle aches or pains, headaches, dizziness, or skin issues. She had a cough during her hospital stay, which she attributes to lying on her back for six days. She occasionally experiences sinus-related headaches across her forehead. She has a recurring rash under her breast, which resolves with cream application. She uses sunscreen and recently saw a dermatologist for nasal bumps, which resolved with prescribed cream. She takes a D3 K2 supplement due to limited sun exposure. She believes her thyroid medication is effective.    She reports no memory issues and engages in activities such as solitaire, keo, and podcast listening. She walks her dog daily and plans to increase her physical activity. She declines a bone density test. She is due for a Tdap vaccine in 2026 and has expressed concerns about the shingles vaccine due to a past Ondina-Barr infection. She is uncertain about her pneumonia vaccine status. She had a mammogram approximately a year ago. She has been following a low-sugar diet and maintains good oral hygiene, visiting the dentist annually and flossing regularly.    SOCIAL HISTORY  She does not drink alcohol or smoke.    MEDICATIONS              Objective   Vital Signs:  /82 (BP Location: Left arm, Patient Position: Sitting, Cuff Size: Adult)   Pulse 65   Temp 96.4 °F (35.8 °C) (Temporal)   Ht 167.6 cm (66\")   Wt 91.1 kg (200 lb 12.8 oz)   SpO2 99%   BMI " 32.41 kg/m²     Physical Exam  Vitals and nursing note reviewed.   Constitutional:       General: She is not in acute distress.     Appearance: She is well-developed. She is not ill-appearing.   HENT:      Head: Normocephalic and atraumatic.      Right Ear: Tympanic membrane, ear canal and external ear normal.      Left Ear: Tympanic membrane, ear canal and external ear normal.      Mouth/Throat:      Mouth: Mucous membranes are moist.      Pharynx: Uvula midline. No posterior oropharyngeal erythema.   Eyes:      General: No scleral icterus.        Right eye: No discharge.         Left eye: No discharge.      Conjunctiva/sclera: Conjunctivae normal.      Pupils: Pupils are equal, round, and reactive to light.   Neck:      Thyroid: No thyromegaly.   Cardiovascular:      Rate and Rhythm: Normal rate and regular rhythm.   Pulmonary:      Effort: Pulmonary effort is normal.      Breath sounds: Normal breath sounds.   Abdominal:      General: Bowel sounds are normal. There is no distension.      Palpations: Abdomen is soft.   Musculoskeletal:         General: No deformity.      Cervical back: Neck supple.      Comments: Gait smooth and steady   Lymphadenopathy:      Cervical: No cervical adenopathy.   Skin:     General: Skin is warm and dry.   Neurological:      General: No focal deficit present.      Mental Status: She is alert and oriented to person, place, and time.   Psychiatric:         Mood and Affect: Mood normal.         Behavior: Behavior normal.         Physical Exam                  Results                Assessment and Plan   Diagnoses and all orders for this visit:    1. Encounter for annual wellness visit (AWV) in Medicare patient (Primary)    2. Adult hypothyroidism  -     TSH    3. Benign essential HTN    4. Mixed hyperlipidemia  -     Lipid Panel With LDL / HDL Ratio    5. Vitamin D deficiency  -     Vitamin D,25-Hydroxy        Assessment & Plan  1. Pancreatitis.  The patient experienced pancreatitis due  to a gallstone lodged in the bile duct, which caused significant pain and elevated blood pressure during hospitalization. She has an appointment with a surgeon on Friday to discuss gallbladder removal. It is recommended to proceed with the surgery when she is not acutely ill to prevent complications such as rupture. Recovery time is expected to be quick, and the surgery will likely be outpatient unless a larger incision is needed.    2.  Hypertension  Her blood pressure spiked to 194/110 during her hospitalization due to pain. She was given additional blood pressure medication intravenously. Her regular medication includes Olmesartan and HCTZ, which she is currently taking at half the usual dose.  She will continue current medication at previous doses and she should continue monitoring her blood pressure and report any significant changes.    3. Watery diarrhea.  She continues to experience watery diarrhea post-hospitalization. It is recommended to advance her diet slowly, focusing on bland foods. She should stay hydrated and report any persistent symptoms.    4. Skin irritation.  She has a recurring raw area under her breast, likely due to a yeast infection. She has been using an over-the-counter cream, which seems to work well.    5.  Leg swelling has resolved and likely due to all the fluid she was given in the hospital due to pancreatitis.  She will continue to monitor.     Health Maintenance.  She is advised to engage in activities that stimulate her brain, such as problem-solving tasks, and to maintain physical activity that increases her heart rate for approximately 30 minutes daily. She is due for a shingles and pneumonia vaccine. She has expressed concerns about the shingles vaccine due to a past Ondina-Barr infection. She is uncertain about her pneumonia vaccine status. She had a mammogram approximately a year ago. She has been following a low-sugar diet and maintains good oral hygiene, visiting the  dentist annually and flossing regularly. A lipid panel will be ordered to assess her cholesterol levels. She is advised to consider receiving the Prevnar 20 or Pneumovax 23 vaccine.     Fasting labs will be ordered to monitor lipids, and she is encouraged to hydrate well prior to the tests. Thyroid function tests will also be ordered.    Hospital discharge summary, radiology, labs and treatment plan reviewed and discussed with patient.  Medication list reconciled.  Patient has good understanding of treatment plan, follow-ups, signs and symptoms that require follow-up.     Appropriate health maintenance and prevention topics specific for this patient were discussed today.  Additionally, health goals, and health concerns addressed as appropriate.  Pt was encouraged to stay up to date on recommended screenings and vaccines based on USPSTF guidelines.            Follow Up   No follow-ups on file.  Patient was given instructions and counseling regarding her condition or for health maintenance advice. Please see specific information pulled into the AVS if appropriate.    Patient or patient representative verbalized consent for the use of Ambient Listening during the visit with  ANGIE Tracey for chart documentation. 1/2/2025  13:16 EST

## 2025-03-19 ENCOUNTER — OFFICE VISIT (OUTPATIENT)
Dept: OBSTETRICS AND GYNECOLOGY | Facility: CLINIC | Age: 65
End: 2025-03-19
Payer: MEDICARE

## 2025-03-19 VITALS
DIASTOLIC BLOOD PRESSURE: 84 MMHG | BODY MASS INDEX: 32.37 KG/M2 | WEIGHT: 201.4 LBS | SYSTOLIC BLOOD PRESSURE: 136 MMHG | HEIGHT: 66 IN

## 2025-03-19 DIAGNOSIS — N87.0 DYSPLASIA OF CERVIX, LOW GRADE (CIN 1): Primary | ICD-10-CM

## 2025-03-19 DIAGNOSIS — Z12.4 ENCOUNTER FOR SCREENING FOR MALIGNANT NEOPLASM OF CERVIX: ICD-10-CM

## 2025-03-19 NOTE — PROGRESS NOTES
"      Christy You is a 64 y.o. patient who presents for follow up of   Chief Complaint   Patient presents with    REPAP ASCUS       65 yo est pt here for 6 month repeat pap smear. She had a Pap that was normal with positive high-risk HPV.  She had a very large endocervical polyp as well.  She had previously had an ablation and had been having some postmenopausal bleeding.  In 3/2024 she had a CKC and a D&C that showed benign endometrium and TRACEY-1 on colon with negative margins.  Her first 6-month repeat Pap in 9/2024 showed ASCUS neg HPV.  This is her second 6 month pap. No  VB. She had gallstone pancreatitis and is still recovering. She needs a MMG and declines DEXA.  No  VB, no bladder issues.       The following portions of the patient's history were reviewed and updated as appropriate: allergies, current medications and problem list.    Review of Systems   Constitutional:  Positive for activity change.   Genitourinary:  Negative for vaginal bleeding, vaginal discharge and vaginal pain.       /84   Ht 167.6 cm (66\")   Wt 91.4 kg (201 lb 6.4 oz)   BMI 32.51 kg/m²     Physical Exam  Vitals and nursing note reviewed. Exam conducted with a chaperone present.   Constitutional:       Appearance: Normal appearance. She is well-developed.   HENT:      Head: Normocephalic and atraumatic.   Eyes:      General: No scleral icterus.     Conjunctiva/sclera: Conjunctivae normal.   Neck:      Thyroid: No thyromegaly.   Abdominal:      General: There is no distension.      Palpations: Abdomen is soft. There is no mass.      Tenderness: There is no abdominal tenderness. There is no guarding or rebound.      Hernia: No hernia is present.   Genitourinary:     General: Normal vulva.      Vagina: Normal.      Cervix: Normal.      Uterus: Normal.    Skin:     General: Skin is warm and dry.   Neurological:      Mental Status: She is alert and oriented to person, place, and time.   Psychiatric:         Mood and Affect: " Mood normal.         Behavior: Behavior normal.         Thought Content: Thought content normal.         Judgment: Judgment normal.         A/P:  1. H/O TRACEY 1 on CKC- second 6 month pap today. Will call with results and f/u  2. MMG UTD 2/2024 B1. Schedule MMG now  3. Pt declines DEXA  4. Cologuard UTD 2/2024, repeat in 3 years  5. EPIC LOS calculator used to determine coding level.     Assessment & Plan   Diagnoses and all orders for this visit:    1. Dysplasia of cervix, low grade (TRACEY 1) (Primary)  -     Pap IG (Image Guided)    2. Encounter for screening for malignant neoplasm of cervix  -     Pap IG (Image Guided)                 No follow-ups on file.      Christina Gonzalez MD    3/19/2025  13:10 EDT

## 2025-03-25 LAB
CYTOLOGIST CVX/VAG CYTO: NORMAL
CYTOLOGY CVX/VAG DOC CYTO: NORMAL
CYTOLOGY CVX/VAG DOC THIN PREP: NORMAL
DX ICD CODE: NORMAL
Lab: NORMAL
OTHER STN SPEC: NORMAL
SERVICE CMNT-IMP: NORMAL
STAT OF ADQ CVX/VAG CYTO-IMP: NORMAL

## 2025-03-31 NOTE — TELEPHONE ENCOUNTER
Rx Refill Note  Requested Prescriptions     Pending Prescriptions Disp Refills    levothyroxine (SYNTHROID, LEVOTHROID) 175 MCG tablet [Pharmacy Med Name: LEVOTHYROXINE 0.175MG (175MCG) TABS] 90 tablet 1     Sig: TAKE 1 TABLET BY MOUTH DAILY      Last office visit with prescribing clinician: 12/30/2024   Last telemedicine visit with prescribing clinician: Visit date not found   Next office visit with prescribing clinician: 12/30/2025                         Would you like a call back once the refill request has been completed: [] Yes [] No    If the office needs to give you a call back, can they leave a voicemail: [] Yes [] No    Von Chen Rep  03/31/25, 08:15 EDT

## 2025-04-01 RX ORDER — LEVOTHYROXINE SODIUM 175 UG/1
175 TABLET ORAL DAILY
Qty: 30 TABLET | Refills: 0 | Status: SHIPPED | OUTPATIENT
Start: 2025-04-01

## 2025-04-01 NOTE — TELEPHONE ENCOUNTER
Please contact patient, medication was refilled, but needs an appointment to get labs done that I ordered in December-TSH not been checked in over 1 yr and also needs 6 month follow up with me in June-SW

## 2025-05-05 RX ORDER — OLMESARTAN MEDOXOMIL AND HYDROCHLOROTHIAZIDE 40/25 40; 25 MG/1; MG/1
1 TABLET ORAL DAILY
Qty: 90 TABLET | Refills: 0 | Status: SHIPPED | OUTPATIENT
Start: 2025-05-05

## 2025-05-05 RX ORDER — LEVOTHYROXINE SODIUM 175 UG/1
175 TABLET ORAL DAILY
Qty: 90 TABLET | Refills: 0 | Status: SHIPPED | OUTPATIENT
Start: 2025-05-05

## 2025-05-05 NOTE — TELEPHONE ENCOUNTER
Rx Refill Note  Requested Prescriptions     Pending Prescriptions Disp Refills    olmesartan-hydrochlorothiazide (BENICAR HCT) 40-25 MG per tablet [Pharmacy Med Name: OLMESARTAN MEDOX/HCTZ 40-25MG TAB] 90 tablet      Sig: TAKE 1 TABLET BY MOUTH DAILY      Last office visit with prescribing clinician: 12/30/2024   Last telemedicine visit with prescribing clinician: Visit date not found   Next office visit with prescribing clinician: 6/2/2025                         Would you like a call back once the refill request has been completed: [] Yes [] No    If the office needs to give you a call back, can they leave a voicemail: [] Yes [] No    Sravani Oh MA  05/05/25, 10:44 EDT

## 2025-05-05 NOTE — TELEPHONE ENCOUNTER
Rx Refill Note  Requested Prescriptions     Pending Prescriptions Disp Refills    levothyroxine (SYNTHROID, LEVOTHROID) 175 MCG tablet [Pharmacy Med Name: LEVOTHYROXINE 0.175MG (175MCG) TABS] 90 tablet 1     Sig: TAKE 1 TABLET BY MOUTH DAILY      Last office visit with prescribing clinician: 12/30/2024   Last telemedicine visit with prescribing clinician: Visit date not found   Next office visit with prescribing clinician: 5/5/2025                         Would you like a call back once the refill request has been completed: [] Yes [] No    If the office needs to give you a call back, can they leave a voicemail: [] Yes [] No    Sravani Oh MA  05/05/25, 10:45 EDT

## 2025-05-21 ENCOUNTER — TELEPHONE (OUTPATIENT)
Dept: FAMILY MEDICINE CLINIC | Facility: CLINIC | Age: 65
End: 2025-05-21

## 2025-05-21 NOTE — TELEPHONE ENCOUNTER
Hub staff attempted to follow warm transfer process and was unsuccessful     Caller: Christy You    Relationship to patient: Self    Best call back number: [phone]    Patient is needing: PLEASE REACH OUT TO PATIENT TO RESCHEDULE HER LAB APPOINTMENT THAT IS ON 5/28/25.

## 2025-05-28 LAB
25(OH)D3+25(OH)D2 SERPL-MCNC: 44.9 NG/ML (ref 30–100)
CHOLEST SERPL-MCNC: 198 MG/DL (ref 0–200)
HDLC SERPL-MCNC: 42 MG/DL (ref 40–60)
LDLC SERPL CALC-MCNC: 135 MG/DL (ref 0–100)
LDLC/HDLC SERPL: 3.15 {RATIO}
TRIGL SERPL-MCNC: 118 MG/DL (ref 0–150)
TSH SERPL DL<=0.005 MIU/L-ACNC: 0.5 UIU/ML (ref 0.27–4.2)
VLDLC SERPL CALC-MCNC: 21 MG/DL (ref 5–40)

## 2025-06-02 ENCOUNTER — OFFICE VISIT (OUTPATIENT)
Dept: FAMILY MEDICINE CLINIC | Facility: CLINIC | Age: 65
End: 2025-06-02
Payer: MEDICARE

## 2025-06-02 VITALS
HEART RATE: 59 BPM | HEIGHT: 66 IN | TEMPERATURE: 97.1 F | WEIGHT: 203.1 LBS | DIASTOLIC BLOOD PRESSURE: 80 MMHG | BODY MASS INDEX: 32.64 KG/M2 | OXYGEN SATURATION: 99 % | RESPIRATION RATE: 14 BRPM | SYSTOLIC BLOOD PRESSURE: 142 MMHG

## 2025-06-02 DIAGNOSIS — E03.9 ADULT HYPOTHYROIDISM: ICD-10-CM

## 2025-06-02 DIAGNOSIS — I10 ESSENTIAL (PRIMARY) HYPERTENSION: Primary | ICD-10-CM

## 2025-06-02 DIAGNOSIS — E78.00 PURE HYPERCHOLESTEROLEMIA: ICD-10-CM

## 2025-06-02 PROCEDURE — 3077F SYST BP >= 140 MM HG: CPT | Performed by: NURSE PRACTITIONER

## 2025-06-02 PROCEDURE — 1160F RVW MEDS BY RX/DR IN RCRD: CPT | Performed by: NURSE PRACTITIONER

## 2025-06-02 PROCEDURE — 99214 OFFICE O/P EST MOD 30 MIN: CPT | Performed by: NURSE PRACTITIONER

## 2025-06-02 PROCEDURE — 3079F DIAST BP 80-89 MM HG: CPT | Performed by: NURSE PRACTITIONER

## 2025-06-02 PROCEDURE — G2211 COMPLEX E/M VISIT ADD ON: HCPCS | Performed by: NURSE PRACTITIONER

## 2025-06-02 PROCEDURE — 1159F MED LIST DOCD IN RCRD: CPT | Performed by: NURSE PRACTITIONER

## 2025-06-02 PROCEDURE — 1126F AMNT PAIN NOTED NONE PRSNT: CPT | Performed by: NURSE PRACTITIONER

## 2025-06-02 NOTE — PROGRESS NOTES
Chief Complaint  Essential (primary) hypertension    Subjective        Christy You presents to [facility]  History of Present Illness    History of Present Illness  The patient presents for a 6-month follow-up visit primarily for hypertension management.    She reports satisfactory blood pressure control, attributing this to a low-carbohydrate diet and subsequent weight loss. Initially, she lost 40 pounds but has since regained some weight due to the reintroduction of carbohydrates into her diet. Home blood pressure readings average around 120/75, although she notes a slight elevation during today's visit.    She has observed that her thyroid-stimulating hormone (TSH) levels are at the lower end of the normal range, following a dosage reduction of levothyroxine last year. She experiences intermittent fatigue but otherwise feels well.    She maintains a carnivorous diet, primarily consuming red meat, which she finds beneficial. However, she experiences sluggishness and increased hunger when her carbohydrate intake is excessive. She does not experience constipation on this diet. She has declined cholesterol medication due to concerns about potential side effects.    She reports no shortness of breath, cough, chest pain, palpitations, nausea, vomiting, urinary issues, or abnormal vaginal bleeding or discharge. Joint pain in her knees and hips is attributed to aging and lack of exercise. She walks her dog regularly. She has declined bone density testing, believing her bones to be strong and herself not prone to falls. She has declined further vaccinations. She undergoes mammograms every two years and does not consider herself at high risk for breast cancer.    She has been experiencing tingling sensations in her legs and feet for the past 6 to 8 months, which she describes as a flush rather than a pins-and-needles sensation. These episodes occur almost daily, lasting 3 to 5 seconds, and  "are most noticeable when she is in bed or sitting on the couch. She also reports occasional back pain. She has been supplementing with potassium due to a previous low level detected during a hospital stay for pancreatitis.    She has a history of gallbladder surgery and reports that her digestion has improved over the past few weeks. She experiences nausea and diarrhea after eating out, regardless of the type of food consumed, but does not have these symptoms when eating at home. She avoids fried foods and suspects that seed oils used in restaurant cooking may be the cause of her symptoms.    PAST SURGICAL HISTORY:  - Gallbladder surgery    FAMILY HISTORY  Her mother  of ALS.       Objective   Vital Signs:  /80   Pulse 59   Temp 97.1 °F (36.2 °C) (Infrared)   Resp 14   Ht 167.6 cm (66\")   Wt 92.1 kg (203 lb 1.6 oz)   SpO2 99%   BMI 32.78 kg/m²   Estimated body mass index is 32.78 kg/m² as calculated from the following:    Height as of this encounter: 167.6 cm (66\").    Weight as of this encounter: 92.1 kg (203 lb 1.6 oz).               Physical Exam  Vitals and nursing note reviewed.   Constitutional:       General: She is not in acute distress.     Appearance: She is well-developed. She is not ill-appearing or diaphoretic.   HENT:      Head: Normocephalic and atraumatic.   Eyes:      General:         Right eye: No discharge.         Left eye: No discharge.      Conjunctiva/sclera: Conjunctivae normal.   Cardiovascular:      Rate and Rhythm: Normal rate and regular rhythm.      Heart sounds: Normal heart sounds.   Pulmonary:      Effort: Pulmonary effort is normal.      Breath sounds: Normal breath sounds.   Abdominal:      General: Bowel sounds are normal.      Palpations: Abdomen is soft.      Tenderness: There is no abdominal tenderness.   Musculoskeletal:         General: No deformity.      Comments: Gait smooth and steady   Skin:     General: Skin is warm and dry.   Neurological:      General: " No focal deficit present.      Mental Status: She is alert and oriented to person, place, and time.   Psychiatric:         Mood and Affect: Mood normal.         Behavior: Behavior normal.          Physical Exam       Result Review :            Results  Labs: 5/27/2025   - TSH: Therapeutic   - Cholesterol: LDL has been increasing over last 3 yrs -stable now at 135                Assessment and Plan     Diagnoses and all orders for this visit:    1. Essential (primary) hypertension (Primary)    2. Adult hypothyroidism    3. Pure hypercholesterolemia        Assessment & Plan  1. Hypertension--chronic-stable  - Blood pressure readings have been within the normal range at home, averaging 120/75.  - Current dietary modifications, including reducing carbohydrate intake, have contributed to weight loss and improved blood pressure control.  - Physical exam today showed slightly elevated blood pressure, likely due to situational stress.  - Continue current dietary regimen and monitor blood pressure at home.  - Continue olmesartan and HCTZ 40/25  - She has CMP done 3/27/2025 with normal potassium actually at the higher end of normal and normal creatinine      2. Hypothyroidism.  - TSH levels were therapeutic  - Dosage of levothyroxine was adjusted last year to address low TSH levels.  - Current thyroid levels are stable and within the normal range.  - Continue current levothyroxine 175 mcg daily    3. Hypercholesterolemia.  - Cholesterol levels have shown a slight increase over the last 3 years  - 10-year cardiovascular risk score is 11.1%, indicating a significant risk.  - Declined cholesterol medication due to concerns about side effects; nonstatin medications and newer injectable options were discussed as potential alternatives.  - Monitor cholesterol levels and consider dietary adjustments.    4.  Flushing lower extremities  - Reports tingling in legs and feet, primarily noticed when lying in bed or sitting on the couch,  started approximately six to eight months ago.  - Differential diagnoses include neuropathic symptoms possibly related to B12 deficiency or other causes.  - Discontinue potassium supplementation     5. Health maintenance.  - Declined bone density testing and vaccinations at this time.  - Mammogram is up to date, with the next one due in 2026.  - Continue routine health maintenance and follow-up as needed.     The 10-year ASCVD risk score (Cande MICHAUD, et al., 2019) is: 11.1%    Values used to calculate the score:      Age: 64 years      Sex: Female      Is Non- : No      Diabetic: No      Tobacco smoker: No      Systolic Blood Pressure: 142 mmHg      Is BP treated: Yes      HDL Cholesterol: 42 mg/dL      Total Cholesterol: 198 mg/dL          Follow Up     No follow-ups on file.  Patient was given instructions and counseling regarding her condition or for health maintenance advice. Please see specific information pulled into the AVS if appropriate.    Patient or patient representative verbalized consent for the use of Ambient Listening during the visit with  ANGIE Tracey for chart documentation. 6/3/2025  07:13 EDT

## 2025-08-04 RX ORDER — LEVOTHYROXINE SODIUM 175 UG/1
175 TABLET ORAL DAILY
Qty: 90 TABLET | Refills: 1 | Status: SHIPPED | OUTPATIENT
Start: 2025-08-04

## 2025-08-06 RX ORDER — OLMESARTAN MEDOXOMIL AND HYDROCHLOROTHIAZIDE 40/25 40; 25 MG/1; MG/1
1 TABLET ORAL DAILY
Qty: 90 TABLET | Refills: 1 | Status: SHIPPED | OUTPATIENT
Start: 2025-08-06

## (undated) DEVICE — SEAL HYSTERSCOPE/OUTFLOW CHANNEL MYOSURE

## (undated) DEVICE — TBG SXN CONN/M 1/4IN 20FT LF STRL

## (undated) DEVICE — TRAP FLD MINIVAC MEGADYNE 100ML

## (undated) DEVICE — TBG PENCL TELESCP MEGADYNE SMOKE EVAC 10FT

## (undated) DEVICE — KT CANISTER AQUILEX FLD/CONTRL

## (undated) DEVICE — DRP Z/FRICTION 10X16IN

## (undated) DEVICE — SUT VIC 0 CT1 CR8 18IN J840D

## (undated) DEVICE — CURETTE ENDOMTRL SXN

## (undated) DEVICE — CONTN URINE STRL SURG

## (undated) DEVICE — NDL SPINE 22G 31/2IN BLK

## (undated) DEVICE — COUNT NDL MAG FM/BLCK 40COUNT

## (undated) DEVICE — PK GYN PERI 90

## (undated) DEVICE — BLD SCLPL BP SS SZ11

## (undated) DEVICE — TBG CONN OUTFLOW AQUILEX/MYOSURE

## (undated) DEVICE — GLV SURG NEOPRN SENSICARE PF SZ/6.5 LF EA/1PR

## (undated) DEVICE — DECANT FLD BG 9IN STRL

## (undated) DEVICE — TBG CONN INFLOW AQUILEX/MYOSURE

## (undated) DEVICE — SWAB PROCTO 16 1/2IN STRL

## (undated) DEVICE — DEV TISS REMOV MYOSURE REACH

## (undated) DEVICE — Device

## (undated) DEVICE — SOL IRR H2O BTL 1000ML STRL

## (undated) DEVICE — PIPET CURET 3MM

## (undated) DEVICE — TP SXN YANKR BULB STRL

## (undated) DEVICE — SYR CONTRL PRESS/LO FIX/M/LL W/THMB/RNG 10ML